# Patient Record
Sex: FEMALE | Race: WHITE | NOT HISPANIC OR LATINO | Employment: UNEMPLOYED | URBAN - METROPOLITAN AREA
[De-identification: names, ages, dates, MRNs, and addresses within clinical notes are randomized per-mention and may not be internally consistent; named-entity substitution may affect disease eponyms.]

---

## 2018-09-14 ENCOUNTER — OFFICE VISIT (OUTPATIENT)
Dept: DERMATOLOGY | Facility: CLINIC | Age: 38
End: 2018-09-14
Payer: COMMERCIAL

## 2018-09-14 VITALS — WEIGHT: 169 LBS | BODY MASS INDEX: 26.47 KG/M2

## 2018-09-14 DIAGNOSIS — L81.0 POST-INFLAMMATORY HYPERPIGMENTATION: ICD-10-CM

## 2018-09-14 DIAGNOSIS — L28.0 LICHEN SIMPLEX: Primary | ICD-10-CM

## 2018-09-14 PROCEDURE — 3008F BODY MASS INDEX DOCD: CPT | Mod: CPTII,S$GLB,, | Performed by: DERMATOLOGY

## 2018-09-14 PROCEDURE — 11900 INJECT SKIN LESIONS </W 7: CPT | Mod: S$GLB,,, | Performed by: DERMATOLOGY

## 2018-09-14 PROCEDURE — 99213 OFFICE O/P EST LOW 20 MIN: CPT | Mod: 25,S$GLB,, | Performed by: DERMATOLOGY

## 2018-09-14 PROCEDURE — 99999 PR PBB SHADOW E&M-EST. PATIENT-LVL III: CPT | Mod: PBBFAC,,, | Performed by: DERMATOLOGY

## 2018-09-14 NOTE — PROGRESS NOTES
Subjective:       Patient ID:  Meri Cosme is a 37 y.o. female who presents for   Chief Complaint   Patient presents with    Spot     left hand, feet     History of Present Illness: The patient presents with chief complaint of spots.  Location: legs, arms, neck  Duration: about a year  Signs/Symptoms: none    Prior treatments: rubs them with otc cream          Review of Systems   Constitutional: Negative for fever.   Skin: Positive for rash. Negative for itching.   Hematologic/Lymphatic: Does not bruise/bleed easily.        Objective:    Physical Exam   Skin:   Areas Examined (abnormalities noted in diagram):   Head / Face Inspection Performed  Neck Inspection Performed  RUE Inspected  LUE Inspection Performed  RLE Inspected  LLE Inspection Performed  Nails and Digits Inspection Performed              Diagram Legend     Erythematous scaling macule/papule c/w actinic keratosis       Vascular papule c/w angioma      Pigmented verrucoid papule/plaque c/w seborrheic keratosis      Yellow umbilicated papule c/w sebaceous hyperplasia      Irregularly shaped tan macule c/w lentigo     1-2 mm smooth white papules consistent with Milia      Movable subcutaneous cyst with punctum c/w epidermal inclusion cyst      Subcutaneous movable cyst c/w pilar cyst      Firm pink to brown papule c/w dermatofibroma      Pedunculated fleshy papule(s) c/w skin tag(s)      Evenly pigmented macule c/w junctional nevus     Mildly variegated pigmented, slightly irregular-bordered macule c/w mildly atypical nevus      Flesh colored to evenly pigmented papule c/w intradermal nevus       Pink pearly papule/plaque c/w basal cell carcinoma      Erythematous hyperkeratotic cursted plaque c/w SCC      Surgical scar with no sign of skin cancer recurrence      Open and closed comedones      Inflammatory papules and pustules      Verrucoid papule consistent consistent with wart     Erythematous eczematous patches and plaques     Dystrophic  onycholytic nail with subungual debris c/w onychomycosis     Umbilicated papule    Erythematous-base heme-crusted tan verrucoid plaque consistent with inflamed seborrheic keratosis     Erythematous Silvery Scaling Plaque c/w Psoriasis     See annotation      Assessment / Plan:        Lichen simplex  Intralesional Kenalog 5mg/cc (0.7 cc total) injected into 5 lesions on the legs arm and neck today after obtaining verbal consent including risk of surrounding hypopigmentation. Patient tolerated procedure well.    Units: 1  NDC for Kenalog 10mg/cc:  3129-0991-98    Explained she needs to dc rubbing or will not resolve        Post-inflammatory hyperpigmentation  Interested in treatment if the pigment not resolved, asking about laser  # given to call to inquire price             Follow-up in about 3 months (around 12/14/2018).

## 2018-11-05 RX ORDER — MUPIROCIN CALCIUM 20 MG/G
CREAM TOPICAL 3 TIMES DAILY
Qty: 30 G | Refills: 3 | Status: SHIPPED | OUTPATIENT
Start: 2018-11-05 | End: 2019-05-01

## 2018-11-08 ENCOUNTER — TELEPHONE (OUTPATIENT)
Dept: DERMATOLOGY | Facility: CLINIC | Age: 38
End: 2018-11-08

## 2018-11-08 NOTE — TELEPHONE ENCOUNTER
----- Message from Mariam Granger sent at 11/8/2018 12:29 PM CST -----  Contact: patient   Patient Returning Call from [facility]    Who Left Message for Patient: Nurse    Communication Preference:[phone]

## 2018-11-08 NOTE — TELEPHONE ENCOUNTER
----- Message from Jayashree Spring MD sent at 11/8/2018 11:53 AM CST -----  Contact: [facility] pharmacy   Please have her ask her pharmacist if the ointment is cheaper on her health plan  ----- Message -----  From: Cheryle Last MA  Sent: 11/8/2018  11:47 AM  To: Jayashree Spring MD Dr, Saw Mrs. Cosme stated that the mupirocin calcium 2% (BACTROBAN) 2 % cream is 299.00 dollars is there a cheaper version then that for the ointment.      ----- Message -----  From: Leah Whitlock  Sent: 11/8/2018  11:39 AM  To: Saw VALLADAR[facility] Calling    Reason for call: pts prescription cost $300.00 pharmacy is asking for something else to be called in   Pharmacy Name: [facility]   Prescription Name: can you call in the ointment for mupirocin would be cheaper for the pt   Phone Number: [phone]  Additional Information: None     REYNALDO

## 2018-11-09 ENCOUNTER — TELEPHONE (OUTPATIENT)
Dept: DERMATOLOGY | Facility: CLINIC | Age: 38
End: 2018-11-09

## 2018-11-09 NOTE — TELEPHONE ENCOUNTER
----- Message from Dawna Fan LPN sent at 11/9/2018 11:36 AM CST -----  Contact: pt at [phone]      ----- Message -----  From: Emily Miller  Sent: 11/9/2018  11:32 AM  To: Saw VALLADARES Staff    [facility] pt-Pt has been waiting 2 days for medication to be called in.  Pt uses [facility] in [place] at [phone].  Pt wants today please.  Call with update.  Needs more affordable medication.

## 2019-01-09 ENCOUNTER — TELEPHONE (OUTPATIENT)
Dept: DERMATOLOGY | Facility: CLINIC | Age: 39
End: 2019-01-09

## 2019-01-09 NOTE — TELEPHONE ENCOUNTER
----- Message from Mariam Granger sent at 1/9/2019 10:32 AM CST -----  Contact: Patient   Needs Advice    Reason for call: Patient states that she was told to contact provider if prescribed medicine was not working for her. Patient states that medicine is not working and would like to discuss a different prescription.      Communication Preference: [phone]

## 2019-05-01 ENCOUNTER — OFFICE VISIT (OUTPATIENT)
Dept: OBSTETRICS AND GYNECOLOGY | Facility: CLINIC | Age: 39
End: 2019-05-01
Payer: COMMERCIAL

## 2019-05-01 VITALS
WEIGHT: 172.81 LBS | DIASTOLIC BLOOD PRESSURE: 74 MMHG | BODY MASS INDEX: 27.12 KG/M2 | SYSTOLIC BLOOD PRESSURE: 116 MMHG | HEIGHT: 67 IN

## 2019-05-01 DIAGNOSIS — O03.4 INCOMPLETE ABORTION: ICD-10-CM

## 2019-05-01 DIAGNOSIS — Z00.00 LABORATORY EXAMINATION ORDERED AS PART OF A ROUTINE GENERAL MEDICAL EXAMINATION: ICD-10-CM

## 2019-05-01 DIAGNOSIS — Z11.3 SCREEN FOR STD (SEXUALLY TRANSMITTED DISEASE): ICD-10-CM

## 2019-05-01 DIAGNOSIS — Z01.419 WELL WOMAN EXAM WITH ROUTINE GYNECOLOGICAL EXAM: Primary | ICD-10-CM

## 2019-05-01 DIAGNOSIS — Z12.4 ROUTINE PAPANICOLAOU SMEAR: ICD-10-CM

## 2019-05-01 PROCEDURE — 87480 CANDIDA DNA DIR PROBE: CPT

## 2019-05-01 PROCEDURE — 99999 PR PBB SHADOW E&M-EST. PATIENT-LVL III: CPT | Mod: PBBFAC,,, | Performed by: OBSTETRICS & GYNECOLOGY

## 2019-05-01 PROCEDURE — 99385 PR PREVENTIVE VISIT,NEW,18-39: ICD-10-PCS | Mod: S$GLB,,, | Performed by: OBSTETRICS & GYNECOLOGY

## 2019-05-01 PROCEDURE — 87510 GARDNER VAG DNA DIR PROBE: CPT

## 2019-05-01 PROCEDURE — 99385 PREV VISIT NEW AGE 18-39: CPT | Mod: S$GLB,,, | Performed by: OBSTETRICS & GYNECOLOGY

## 2019-05-01 PROCEDURE — 87491 CHLMYD TRACH DNA AMP PROBE: CPT

## 2019-05-01 PROCEDURE — 88175 CYTOPATH C/V AUTO FLUID REDO: CPT

## 2019-05-01 PROCEDURE — 99999 PR PBB SHADOW E&M-EST. PATIENT-LVL III: ICD-10-PCS | Mod: PBBFAC,,, | Performed by: OBSTETRICS & GYNECOLOGY

## 2019-05-01 NOTE — PROGRESS NOTES
OBSTETRIC HISTORY:   OB History        3    Para        Term                AB   1    Living   2       SAB   0    TAB   1    Ectopic        Multiple        Live Births                    COMPREHENSIVE GYN HISTORY:  PAP History: Denies abnormal Paps.  Infection History: Denies STDs. Denies PID.  Benign History: Denies uterine fibroids. Denies ovarian cysts. Denies endometriosis.   Cancer History: Denies cervical cancer. Denies uterine cancer or hyperplasia. Denies ovarian cancer. Denies vulvar cancer or pre-cancer. Denies vaginal cancer or pre-cancer. Denies breast cancer. Denies colon cancer.  Sexual Activity History:   reports that she currently engages in sexual activity and has had male partners. She reports using the following method of birth control/protection: None.   Menstrual History: Every 21 days, flows for 5-7 days. Normal flow.  Dysmenorrhea History: Reports occ. dysmenorrhea.  Contraception: None         HPI:   38 y.o.  Patient's last menstrual period was 2018 (exact date).   Patient is  here for her annual gynecologic exam.  Patient is very tearful as she had a termination of a pregnancy in February and she now regrets her decision. We discussed birth control and she will consider the Paragard copper IUD but for now condoms. She states that since the termination she feels like maybe they did not get everything out and she feels itchy and some discomfort in lower abdomen. She also had some dizziness. She denies bladder, breast and bowel complaints.    ROS:  GENERAL: Denies weight gain or weight loss. Feeling well overall.   SKIN: Denies rash or lesions.   HEAD: Denies headache.   NODES: Denies enlarged lymph nodes.   CHEST: Denies shortness of breath.   ABDOMEN: No constipation, diarrhea, nausea, vomiting or rectal bleeding.   URINARY: No frequency, dysuria, hematuria, or burning on urination.  REPRODUCTIVE: See HPI.   BREASTS: The patient denies pain, lumps, or nipple discharge.  "  HEMATOLOGIC: No easy bruisability.   MUSCULOSKELETAL: Denies joint pain or back pain.   NEUROLOGIC: Denies weakness.   PSYCHIATRIC: Denies depression, anxiety or mood swings.    PE:   /74   Ht 5' 7" (1.702 m)   Wt 78.4 kg (172 lb 13.5 oz)   LMP 04/14/2018 (Exact Date)   BMI 27.07 kg/m²   APPEARANCE: Well nourished, well developed, in no acute distress.  NECK: Neck symmetric without  thyromegaly.  NODES: No inguinal, cervical lymph node enlargement.  CHEST: Lungs clear to auscultation.  HEART: Regular rate and rhythm, no murmurs, rubs or gallops.  ABDOMEN: Soft. No tenderness or masses. No hernias.  BREASTS: Symmetrical, no skin changes or visible lesions. No palpable masses, nipple discharge or adenopathy bilaterally.  PELVIC:   VULVA: No lesions. Normal female genitalia.  URETHRAL MEATUS: Normal size and location, no lesions, no prolapse.  URETHRA: No masses, tenderness, prolapse or scarring.  VAGINA: Moist and well rugated, some discharge, no significant cystocele or rectocele.  CERVIX: No lesions and discharge.  UTERUS: Normal size, regular shape, mobile, non-tender, bladder base nontender.  ADNEXA: No masses or tenderness.    PROCEDURES:  Pap smear  Affirm  GC/CT    Assessment:  Normal Gynecologic Exam  Dizziness related to termination-- CBC  Recent termination-- BHCG to rule out retained products  Vaginal discharge-- cultures    Plan:  Mammogram and Colonoscopy if indicated by current recommendations.  Return to clinic in one year or for any problems or complaints.    Counseling:  Patient was counseled today on A.C.S. Pap guidelines and recommendations for yearly pelvic exams and monthly self breast exams; to see her PCP for other health maintenance. Regular exercise and healthy diet.          "

## 2019-05-02 ENCOUNTER — TELEPHONE (OUTPATIENT)
Dept: OBSTETRICS AND GYNECOLOGY | Facility: CLINIC | Age: 39
End: 2019-05-02

## 2019-05-02 ENCOUNTER — LAB VISIT (OUTPATIENT)
Dept: LAB | Facility: HOSPITAL | Age: 39
End: 2019-05-02
Attending: OBSTETRICS & GYNECOLOGY
Payer: COMMERCIAL

## 2019-05-02 DIAGNOSIS — Z00.00 LABORATORY EXAMINATION ORDERED AS PART OF A ROUTINE GENERAL MEDICAL EXAMINATION: ICD-10-CM

## 2019-05-02 DIAGNOSIS — O03.4 INCOMPLETE ABORTION: ICD-10-CM

## 2019-05-02 DIAGNOSIS — Z01.419 WELL WOMAN EXAM WITH ROUTINE GYNECOLOGICAL EXAM: ICD-10-CM

## 2019-05-02 LAB
ANISOCYTOSIS BLD QL SMEAR: SLIGHT
BASOPHILS # BLD AUTO: 0.01 K/UL (ref 0–0.2)
BASOPHILS NFR BLD: 0.3 % (ref 0–1.9)
DIFFERENTIAL METHOD: ABNORMAL
EOSINOPHIL # BLD AUTO: 0.1 K/UL (ref 0–0.5)
EOSINOPHIL NFR BLD: 4.3 % (ref 0–8)
ERYTHROCYTE [DISTWIDTH] IN BLOOD BY AUTOMATED COUNT: 16.9 % (ref 11.5–14.5)
HCG INTACT+B SERPL-ACNC: <1.2 MIU/ML
HCT VFR BLD AUTO: 23.8 % (ref 37–48.5)
HGB BLD-MCNC: 6.4 G/DL (ref 12–16)
HYPOCHROMIA BLD QL SMEAR: ABNORMAL
LYMPHOCYTES # BLD AUTO: 0.9 K/UL (ref 1–4.8)
LYMPHOCYTES NFR BLD: 31.1 % (ref 18–48)
MCH RBC QN AUTO: 19.5 PG (ref 27–31)
MCHC RBC AUTO-ENTMCNC: 26.9 G/DL (ref 32–36)
MCV RBC AUTO: 72 FL (ref 82–98)
MONOCYTES # BLD AUTO: 0.3 K/UL (ref 0.3–1)
MONOCYTES NFR BLD: 9.3 % (ref 4–15)
NEUTROPHILS # BLD AUTO: 1.7 K/UL (ref 1.8–7.7)
NEUTROPHILS NFR BLD: 55 % (ref 38–73)
PLATELET # BLD AUTO: 260 K/UL (ref 150–350)
PLATELET BLD QL SMEAR: ABNORMAL
PMV BLD AUTO: 10 FL (ref 9.2–12.9)
POIKILOCYTOSIS BLD QL SMEAR: SLIGHT
POLYCHROMASIA BLD QL SMEAR: ABNORMAL
RBC # BLD AUTO: 3.29 M/UL (ref 4–5.4)
WBC # BLD AUTO: 3.02 K/UL (ref 3.9–12.7)

## 2019-05-02 PROCEDURE — 84702 CHORIONIC GONADOTROPIN TEST: CPT

## 2019-05-02 PROCEDURE — 85025 COMPLETE CBC W/AUTO DIFF WBC: CPT

## 2019-05-02 PROCEDURE — 36415 COLL VENOUS BLD VENIPUNCTURE: CPT

## 2019-05-02 NOTE — TELEPHONE ENCOUNTER
"Patient notified of results and her options.  Patient states she is "not having symptoms any more".  Patient notified if she starts having symptoms then she should seriously consider going to the ED for a blood transfusion.  Patient verbalized understanding.  "

## 2019-05-02 NOTE — TELEPHONE ENCOUNTER
Notify not pregnant but has severe anemia. If having symptoms of anemia like dizziness and she would be willing to get a blood transfusion she needs to go to ED. If she is not willing to get a blood transfusion she needs to start on OTC iron immediately.

## 2019-05-03 ENCOUNTER — HOSPITAL ENCOUNTER (OUTPATIENT)
Facility: HOSPITAL | Age: 39
Discharge: HOME | End: 2019-05-04
Attending: EMERGENCY MEDICINE | Admitting: HOSPITALIST
Payer: COMMERCIAL

## 2019-05-03 DIAGNOSIS — D64.9 SYMPTOMATIC ANEMIA: Primary | ICD-10-CM

## 2019-05-03 LAB
ABO + RH BLD: NORMAL
ANION GAP SERPL CALC-SCNC: 10 MMOL/L (ref 8–16)
BACTERIAL VAGINOSIS DNA: NEGATIVE
BASOPHILS # BLD AUTO: 0.01 K/UL (ref 0–0.2)
BASOPHILS NFR BLD: 0.3 % (ref 0–1.9)
BILIRUB SERPL-MCNC: 0.5 MG/DL (ref 0.1–1)
BLD GP AB SCN CELLS X3 SERPL QL: NORMAL
BLD PROD TYP BPU: NORMAL
BLD PROD TYP BPU: NORMAL
BLOOD UNIT EXPIRATION DATE: NORMAL
BLOOD UNIT EXPIRATION DATE: NORMAL
BLOOD UNIT TYPE CODE: 9500
BLOOD UNIT TYPE CODE: 9500
BLOOD UNIT TYPE: NORMAL
BLOOD UNIT TYPE: NORMAL
BUN SERPL-MCNC: 15 MG/DL (ref 6–20)
C TRACH DNA SPEC QL NAA+PROBE: NOT DETECTED
CALCIUM SERPL-MCNC: 9.5 MG/DL (ref 8.7–10.5)
CANDIDA GLABRATA DNA: NORMAL
CANDIDA KRUSEI DNA: NORMAL
CANDIDA RRNA VAG QL PROBE: NORMAL
CHLORIDE SERPL-SCNC: 109 MMOL/L (ref 95–110)
CO2 SERPL-SCNC: 20 MMOL/L (ref 23–29)
CODING SYSTEM: NORMAL
CODING SYSTEM: NORMAL
CREAT SERPL-MCNC: 0.7 MG/DL (ref 0.5–1.4)
DIFFERENTIAL METHOD: ABNORMAL
DISPENSE STATUS: NORMAL
DISPENSE STATUS: NORMAL
EOSINOPHIL # BLD AUTO: 0.1 K/UL (ref 0–0.5)
EOSINOPHIL NFR BLD: 2.3 % (ref 0–8)
ERYTHROCYTE [DISTWIDTH] IN BLOOD BY AUTOMATED COUNT: 16.9 % (ref 11.5–14.5)
EST. GFR  (AFRICAN AMERICAN): >60 ML/MIN/1.73 M^2
EST. GFR  (NON AFRICAN AMERICAN): >60 ML/MIN/1.73 M^2
FERRITIN SERPL-MCNC: 2 NG/ML (ref 20–300)
GLUCOSE SERPL-MCNC: 75 MG/DL (ref 70–110)
HCT VFR BLD AUTO: 25.5 % (ref 37–48.5)
HGB BLD-MCNC: 6.9 G/DL (ref 12–16)
HYPOCHROMIA BLD QL SMEAR: ABNORMAL
IRON SERPL-MCNC: 17 UG/DL (ref 30–160)
LYMPHOCYTES # BLD AUTO: 1.2 K/UL (ref 1–4.8)
LYMPHOCYTES NFR BLD: 37.7 % (ref 18–48)
MCH RBC QN AUTO: 19.3 PG (ref 27–31)
MCHC RBC AUTO-ENTMCNC: 27.1 G/DL (ref 32–36)
MCV RBC AUTO: 71 FL (ref 82–98)
MONOCYTES # BLD AUTO: 0.3 K/UL (ref 0.3–1)
MONOCYTES NFR BLD: 9.4 % (ref 4–15)
N GONORRHOEA DNA SPEC QL NAA+PROBE: NOT DETECTED
NEUTROPHILS # BLD AUTO: 1.6 K/UL (ref 1.8–7.7)
NEUTROPHILS NFR BLD: 50.3 % (ref 38–73)
PLATELET # BLD AUTO: 270 K/UL (ref 150–350)
PLATELET BLD QL SMEAR: ABNORMAL
PMV BLD AUTO: 10 FL (ref 9.2–12.9)
POLYCHROMASIA BLD QL SMEAR: ABNORMAL
POTASSIUM SERPL-SCNC: 4.2 MMOL/L (ref 3.5–5.1)
RBC # BLD AUTO: 3.58 M/UL (ref 4–5.4)
RH BLD: NORMAL
SATURATED IRON: 3 % (ref 20–50)
SODIUM SERPL-SCNC: 139 MMOL/L (ref 136–145)
T VAGINALIS RRNA GENITAL QL PROBE: NORMAL
TOTAL IRON BINDING CAPACITY: 608 UG/DL (ref 250–450)
TRANS ERYTHROCYTES VOL PATIENT: NORMAL ML
TRANS ERYTHROCYTES VOL PATIENT: NORMAL ML
TRANSFERRIN SERPL-MCNC: 411 MG/DL (ref 200–375)
WBC # BLD AUTO: 3.08 K/UL (ref 3.9–12.7)

## 2019-05-03 PROCEDURE — 83540 ASSAY OF IRON: CPT

## 2019-05-03 PROCEDURE — 86901 BLOOD TYPING SEROLOGIC RH(D): CPT

## 2019-05-03 PROCEDURE — 86850 RBC ANTIBODY SCREEN: CPT

## 2019-05-03 PROCEDURE — 99285 EMERGENCY DEPT VISIT HI MDM: CPT | Mod: 25

## 2019-05-03 PROCEDURE — P9021 RED BLOOD CELLS UNIT: HCPCS

## 2019-05-03 PROCEDURE — G0378 HOSPITAL OBSERVATION PER HR: HCPCS

## 2019-05-03 PROCEDURE — 36430 TRANSFUSION BLD/BLD COMPNT: CPT

## 2019-05-03 PROCEDURE — 82247 BILIRUBIN TOTAL: CPT

## 2019-05-03 PROCEDURE — 80048 BASIC METABOLIC PNL TOTAL CA: CPT

## 2019-05-03 PROCEDURE — 85025 COMPLETE CBC W/AUTO DIFF WBC: CPT

## 2019-05-03 PROCEDURE — 86920 COMPATIBILITY TEST SPIN: CPT

## 2019-05-03 PROCEDURE — 82728 ASSAY OF FERRITIN: CPT

## 2019-05-03 RX ORDER — ONDANSETRON 4 MG/1
4 TABLET, ORALLY DISINTEGRATING ORAL EVERY 6 HOURS PRN
Status: DISCONTINUED | OUTPATIENT
Start: 2019-05-03 | End: 2019-05-04 | Stop reason: HOSPADM

## 2019-05-03 RX ORDER — ONDANSETRON 2 MG/ML
4 INJECTION INTRAMUSCULAR; INTRAVENOUS EVERY 6 HOURS PRN
Status: DISCONTINUED | OUTPATIENT
Start: 2019-05-03 | End: 2019-05-04 | Stop reason: HOSPADM

## 2019-05-03 RX ORDER — SODIUM CHLORIDE 0.9 % (FLUSH) 0.9 %
10 SYRINGE (ML) INJECTION
Status: DISCONTINUED | OUTPATIENT
Start: 2019-05-03 | End: 2019-05-04 | Stop reason: HOSPADM

## 2019-05-03 RX ORDER — HYDROCODONE BITARTRATE AND ACETAMINOPHEN 500; 5 MG/1; MG/1
TABLET ORAL
Status: DISCONTINUED | OUTPATIENT
Start: 2019-05-03 | End: 2019-05-04 | Stop reason: HOSPADM

## 2019-05-03 RX ORDER — ACETAMINOPHEN 325 MG/1
650 TABLET ORAL EVERY 4 HOURS PRN
Status: DISCONTINUED | OUTPATIENT
Start: 2019-05-03 | End: 2019-05-04 | Stop reason: HOSPADM

## 2019-05-03 NOTE — ED NOTES
Spoke with MARCOS Torres from Dr. Wynn's office she reports she spoke with pt yesterday pt c/o dizziness and not feeling well after elective  in February. Pt was sent for labs  and informed pt of her hemoglobin was 6.4 and she was advised to report to ED for further evaluation and blood transfusion. Pt could not come in until today.

## 2019-05-03 NOTE — H&P
[facility] Medicine  History & Physical    Patient Name: Meri Cosme  MRN: [id number]  Admission Date: 5/3/2019  Attending Physician: Mandeep Delvalle DO   Primary Care Provider: Primary Doctor No         Patient information was obtained from patient, past medical records and ER records.     Subjective:     Principal Problem:Symptomatic anemia    Chief Complaint:   Chief Complaint   Patient presents with    Abnormal Lab     Pt presents to ED today per Dr Wynn for blood transfusion. Upon arriving pt requested RN to call Dr. Wynn's office and confirm that she was not a direct admit to the hospital. RN spoke with MARCOS Torres at Dr. Wynn's office who confirmed pt was instruced yesterday to report to ED for hemoglobin level of 6.4. pt informed of information and she requested to call billing office to confirm price of ED visit prior to being triage. after speaking to billing office pt  states she was ready to be seen         HPI: Ms. Meri Cosme is a 37 y/o female who lives in [place], [place] at her residence. The patient has no PCP at this time. The patient has a past medical history of heart disease. The patient has a past surgical history of a breast reduction (2014), , and  (). The patient smokes cigarettes, drinks occasional alcohol, and denies illicit drug use. The patient presents to the ED here at [facility] with complaints of fatigue. The patient saw her OBGYN on yesterday for her annual exam, hemoglobin was noted to be low. Patient was advised by her OBGYN to come into the hospital for blood transfusion. The patient reports feeling fatigued and weak over 2 months since she terminated her pregnancy. Annual exam on yesterday did not show any abnormalities.  The patient denies fever, chills, SOB, CP, palpitations, abd pain, nausea, vomiting, diarrhea, feeling lightheaded, and she denies syncope at home. The patient also reports heavy cycles on  a normal basis before the pregnancy was terminated. Cycles lasts for 7 days, very heavy for the first 5 days. The patients labs show hemoglobin---6.9, hematocrit---25.5. The patient was admitted to the CDU for observation.     Past Medical History:   Diagnosis Date    Heart disease        Past Surgical History:   Procedure Laterality Date    BREAST RECONSTRUCTION  2014    lift     SECTION         Review of patient's allergies indicates:  No Known Allergies    No current facility-administered medications on file prior to encounter.      Current Outpatient Medications on File Prior to Encounter   Medication Sig    clindamycin (CLEOCIN T) 1 % lotion Apply topically 2 (two) times daily.     Family History     Problem Relation (Age of Onset)    Heart disease Mother        Tobacco Use    Smoking status: Current Some Day Smoker    Tobacco comment: light smoker   Substance and Sexual Activity    Alcohol use: Yes     Comment: ocacsional    Drug use: No    Sexual activity: Yes     Partners: Male     Birth control/protection: None     Review of Systems   Constitutional: Positive for activity change, appetite change and fatigue. Negative for chills, diaphoresis, fever and unexpected weight change.   HENT: Negative for trouble swallowing.    Eyes: Negative for visual disturbance.   Respiratory: Negative for cough, shortness of breath and wheezing.    Cardiovascular: Negative for chest pain, palpitations and leg swelling.   Gastrointestinal: Negative for abdominal distention, abdominal pain, blood in stool, constipation, diarrhea, nausea and vomiting.   Genitourinary: Negative for difficulty urinating and hematuria.   Musculoskeletal: Negative for arthralgias, back pain, gait problem, joint swelling and myalgias.   Skin: Negative for color change, pallor, rash and wound.   Neurological: Positive for weakness. Negative for dizziness, tremors, seizures, syncope, facial asymmetry, speech difficulty,  light-headedness, numbness and headaches.   Hematological: Does not bruise/bleed easily.   Psychiatric/Behavioral: Negative for agitation, confusion and hallucinations. The patient is not nervous/anxious and is not hyperactive.      Objective:     Vital Signs (Most Recent):  Temp: 99.4 °F (37.4 °C) (05/03/19 1418)  Pulse: 67 (05/03/19 1418)  Resp: 16 (05/03/19 1418)  BP: (!) 99/51 (05/03/19 1418)  SpO2: 100 % (05/03/19 1418) Vital Signs (24h Range):  Temp:  [98.3 °F (36.8 °C)-99.4 °F (37.4 °C)] 99.4 °F (37.4 °C)  Pulse:  [64-78] 67  Resp:  [16-19] 16  SpO2:  [99 %-100 %] 100 %  BP: ()/(51-74) 99/51     Weight: 78 kg (172 lb)  Body mass index is 26.94 kg/m².    Physical Exam   Constitutional: She is oriented to person, place, and time. She appears well-developed and well-nourished. No distress.   HENT:   Head: Normocephalic and atraumatic.   Eyes: Pupils are equal, round, and reactive to light. Conjunctivae and EOM are normal.   Neck: Normal range of motion. Neck supple. No JVD present.   Cardiovascular: Normal rate, regular rhythm, normal heart sounds and intact distal pulses.   No murmur heard.  Pulmonary/Chest: Effort normal and breath sounds normal. No respiratory distress. She has no wheezes.   Abdominal: Soft. Bowel sounds are normal. She exhibits no distension. There is no tenderness. There is no guarding.   Musculoskeletal: Normal range of motion.   Neurological: She is alert and oriented to person, place, and time.   Skin: Skin is warm and dry. Capillary refill takes less than 2 seconds. No rash noted. She is not diaphoretic. No erythema.   Psychiatric: She has a normal mood and affect. Her behavior is normal. Judgment and thought content normal.   Nursing note and vitals reviewed.        CRANIAL NERVES     CN III, IV, VI   Pupils are equal, round, and reactive to light.  Extraocular motions are normal.        Significant Labs:   CBC:   Recent Labs   Lab 05/02/19  0810 05/03/19  1124   WBC 3.02* 3.08*    HGB 6.4* 6.9*   HCT 23.8* 25.5*    270       Significant Imaging: I have reviewed all pertinent imaging results/findings within the past 24 hours.    Assessment/Plan:     * Symptomatic anemia  The patient reports an  2 months ago, since then she has been feeling fatigued. The patient also reports heavy menses; menses a total of 7 days, very heavy the first 5 days.    -hemoglobin 6.9  -ordered 2 units of PRBCs  -check Iron studies  -monitor CBC        History of syncope  No problems at this time.  Monitor  Fall precautions         VTE Risk Mitigation (From admission, onward)        Ordered     Place sequential compression device  Until discontinued      19 1308     IP VTE LOW RISK PATIENT  Once      19 1308             Tan Fagan NP  Department of Hospital Medicine   [facility]

## 2019-05-03 NOTE — SUBJECTIVE & OBJECTIVE
Past Medical History:   Diagnosis Date    Heart disease        Past Surgical History:   Procedure Laterality Date    BREAST RECONSTRUCTION  2014    lift     SECTION         Review of patient's allergies indicates:  No Known Allergies    No current facility-administered medications on file prior to encounter.      Current Outpatient Medications on File Prior to Encounter   Medication Sig    clindamycin (CLEOCIN T) 1 % lotion Apply topically 2 (two) times daily.     Family History     Problem Relation (Age of Onset)    Heart disease Mother        Tobacco Use    Smoking status: Current Some Day Smoker    Tobacco comment: light smoker   Substance and Sexual Activity    Alcohol use: Yes     Comment: ocacsional    Drug use: No    Sexual activity: Yes     Partners: Male     Birth control/protection: None     Review of Systems   Constitutional: Positive for activity change, appetite change and fatigue. Negative for chills, diaphoresis, fever and unexpected weight change.   HENT: Negative for trouble swallowing.    Eyes: Negative for visual disturbance.   Respiratory: Negative for cough, shortness of breath and wheezing.    Cardiovascular: Negative for chest pain, palpitations and leg swelling.   Gastrointestinal: Negative for abdominal distention, abdominal pain, blood in stool, constipation, diarrhea, nausea and vomiting.   Genitourinary: Negative for difficulty urinating and hematuria.   Musculoskeletal: Negative for arthralgias, back pain, gait problem, joint swelling and myalgias.   Skin: Negative for color change, pallor, rash and wound.   Neurological: Positive for weakness. Negative for dizziness, tremors, seizures, syncope, facial asymmetry, speech difficulty, light-headedness, numbness and headaches.   Hematological: Does not bruise/bleed easily.   Psychiatric/Behavioral: Negative for agitation, confusion and hallucinations. The patient is not nervous/anxious and is not hyperactive.       Objective:     Vital Signs (Most Recent):  Temp: 99.4 °F (37.4 °C) (05/03/19 1418)  Pulse: 67 (05/03/19 1418)  Resp: 16 (05/03/19 1418)  BP: (!) 99/51 (05/03/19 1418)  SpO2: 100 % (05/03/19 1418) Vital Signs (24h Range):  Temp:  [98.3 °F (36.8 °C)-99.4 °F (37.4 °C)] 99.4 °F (37.4 °C)  Pulse:  [64-78] 67  Resp:  [16-19] 16  SpO2:  [99 %-100 %] 100 %  BP: ()/(51-74) 99/51     Weight: 78 kg (172 lb)  Body mass index is 26.94 kg/m².    Physical Exam   Constitutional: She is oriented to person, place, and time. She appears well-developed and well-nourished. No distress.   HENT:   Head: Normocephalic and atraumatic.   Eyes: Pupils are equal, round, and reactive to light. Conjunctivae and EOM are normal.   Neck: Normal range of motion. Neck supple. No JVD present.   Cardiovascular: Normal rate, regular rhythm, normal heart sounds and intact distal pulses.   No murmur heard.  Pulmonary/Chest: Effort normal and breath sounds normal. No respiratory distress. She has no wheezes.   Abdominal: Soft. Bowel sounds are normal. She exhibits no distension. There is no tenderness. There is no guarding.   Musculoskeletal: Normal range of motion.   Neurological: She is alert and oriented to person, place, and time.   Skin: Skin is warm and dry. Capillary refill takes less than 2 seconds. No rash noted. She is not diaphoretic. No erythema.   Psychiatric: She has a normal mood and affect. Her behavior is normal. Judgment and thought content normal.   Nursing note and vitals reviewed.        CRANIAL NERVES     CN III, IV, VI   Pupils are equal, round, and reactive to light.  Extraocular motions are normal.        Significant Labs:   CBC:   Recent Labs   Lab 05/02/19  0810 05/03/19  1124   WBC 3.02* 3.08*   HGB 6.4* 6.9*   HCT 23.8* 25.5*    270       Significant Imaging: I have reviewed all pertinent imaging results/findings within the past 24 hours.

## 2019-05-03 NOTE — HPI
Ms. Meri Cosme is a 39 y/o female who lives in [place], [place] at her residence. The patient has no PCP at this time. The patient has a past medical history of heart disease. The patient has a past surgical history of a breast reduction (2014), , and  (2019). The patient smokes cigarettes, drinks occasional alcohol, and denies illicit drug use. The patient presents to the ED here at [facility] with complaints of fatigue. The patient saw her OBGYN on yesterday for her annual exam, hemoglobin was noted to be low. Patient was advised by her OBGYN to come into the hospital for blood transfusion. The patient reports feeling fatigued and weak over 2 months since she terminated her pregnancy. Annual exam on yesterday did not show any abnormalities.  The patient denies fever, chills, SOB, CP, palpitations, abd pain, nausea, vomiting, diarrhea, feeling lightheaded, and she denies syncope at home. The patient also reports heavy cycles on a normal basis before the pregnancy was terminated. Cycles lasts for 7 days, very heavy for the first 5 days. The patients labs show hemoglobin---6.9, hematocrit---25.5. The patient was admitted to the CDU for observation.

## 2019-05-03 NOTE — ASSESSMENT & PLAN NOTE
The patient reports an  2 months ago, since then she has been feeling fatigued. The patient also reports heavy menses; menses a total of 7 days, very heavy the first 5 days.    -hemoglobin 6.9  -ordered 2 units of PRBCs  -check Iron studies  -monitor CBC

## 2019-05-03 NOTE — ED TRIAGE NOTES
Pt sent in by doctor for transfusion , pt had the  pill in February and bleed after for 2 months , pt states she bleed heavy with clots using 15 to 20 boxes of pads  for the entire  2 months time  , pt states she began feeling weak , tired , faint when trying to stand  and dizzy during this time  , pt stopped bleeding  during that time she started to feel lower rt side abdominal pain and fatigue , presently still feeling fatigue pain has resolved

## 2019-05-03 NOTE — PLAN OF CARE
Problem: Adult Inpatient Plan of Care  Goal: Plan of Care Review  Outcome: Ongoing (interventions implemented as appropriate)  Patient safety maintained throughout this shift, IV infusing, tele monitor active, pt receiving 2 units PRBCs, the first unit coagulated three times, blood tubing was changed three times until blood stop infusing and first unit was stopped with 55 mls left, 2nd unit blood started on pt and infusing without difficulty, no signs or symptoms of transfusion reaction @ present, labs to be drawn after infusion, pt lying in bed quietly, bed alarm active, pt in stable condition, no signs of pain or discomfort, will continue to monitor

## 2019-05-04 VITALS
SYSTOLIC BLOOD PRESSURE: 112 MMHG | BODY MASS INDEX: 27 KG/M2 | TEMPERATURE: 98 F | OXYGEN SATURATION: 99 % | DIASTOLIC BLOOD PRESSURE: 53 MMHG | HEART RATE: 63 BPM | RESPIRATION RATE: 15 BRPM | WEIGHT: 172 LBS | HEIGHT: 67 IN

## 2019-05-04 PROBLEM — D64.9 SYMPTOMATIC ANEMIA: Status: RESOLVED | Noted: 2019-05-03 | Resolved: 2019-05-04

## 2019-05-04 LAB
BASOPHILS # BLD AUTO: 0.01 K/UL (ref 0–0.2)
BASOPHILS NFR BLD: 0.3 % (ref 0–1.9)
DIFFERENTIAL METHOD: ABNORMAL
EOSINOPHIL # BLD AUTO: 0.1 K/UL (ref 0–0.5)
EOSINOPHIL NFR BLD: 2.7 % (ref 0–8)
ERYTHROCYTE [DISTWIDTH] IN BLOOD BY AUTOMATED COUNT: 18.2 % (ref 11.5–14.5)
HCT VFR BLD AUTO: 28.2 % (ref 37–48.5)
HGB BLD-MCNC: 8.1 G/DL (ref 12–16)
LYMPHOCYTES # BLD AUTO: 1 K/UL (ref 1–4.8)
LYMPHOCYTES NFR BLD: 26.2 % (ref 18–48)
MCH RBC QN AUTO: 21.3 PG (ref 27–31)
MCHC RBC AUTO-ENTMCNC: 28.7 G/DL (ref 32–36)
MCV RBC AUTO: 74 FL (ref 82–98)
MONOCYTES # BLD AUTO: 0.4 K/UL (ref 0.3–1)
MONOCYTES NFR BLD: 10.1 % (ref 4–15)
NEUTROPHILS # BLD AUTO: 2.2 K/UL (ref 1.8–7.7)
NEUTROPHILS NFR BLD: 60.7 % (ref 38–73)
PLATELET # BLD AUTO: 229 K/UL (ref 150–350)
PMV BLD AUTO: 10.7 FL (ref 9.2–12.9)
RBC # BLD AUTO: 3.81 M/UL (ref 4–5.4)
WBC # BLD AUTO: 3.67 K/UL (ref 3.9–12.7)

## 2019-05-04 PROCEDURE — 36415 COLL VENOUS BLD VENIPUNCTURE: CPT

## 2019-05-04 PROCEDURE — 85025 COMPLETE CBC W/AUTO DIFF WBC: CPT

## 2019-05-04 PROCEDURE — G0378 HOSPITAL OBSERVATION PER HR: HCPCS

## 2019-05-04 RX ORDER — FERROUS SULFATE 325(65) MG
325 TABLET ORAL
Qty: 30 TABLET | Refills: 11 | Status: SHIPPED | OUTPATIENT
Start: 2019-05-04 | End: 2020-06-15

## 2019-05-04 RX ORDER — SENNOSIDES 8.6 MG/1
1 TABLET ORAL DAILY
Qty: 30 TABLET | Refills: 11 | Status: SHIPPED | OUTPATIENT
Start: 2019-05-04 | End: 2019-05-22

## 2019-05-04 NOTE — DISCHARGE SUMMARY
[facility] Medicine  Discharge Summary      Patient Name: Meri Cosme  MRN: [id number]  Admission Date: 5/3/2019  Hospital Length of Stay: 0 days  Discharge Date and Time:  2019 8:22 AM  Attending Physician: Mandeep Delvalle DO   Discharging Provider: Tan Fagan NP  Primary Care Provider: Primary Doctor No      HPI:   Ms. Meri Cosme is a 37 y/o female who lives in [place], [place] at her residence. The patient has no PCP at this time. The patient has a past medical history of heart disease. The patient has a past surgical history of a breast reduction (2014), , and  (). The patient smokes cigarettes, drinks occasional alcohol, and denies illicit drug use. The patient presents to the ED here at [facility] with complaints of fatigue. The patient saw her OBGYN on yesterday for her annual exam, hemoglobin was noted to be low. Patient was advised by her OBGYN to come into the hospital for blood transfusion. The patient reports feeling fatigued and weak over 2 months since she terminated her pregnancy. Annual exam on yesterday did not show any abnormalities.  The patient denies fever, chills, SOB, CP, palpitations, abd pain, nausea, vomiting, diarrhea, feeling lightheaded, and she denies syncope at home. The patient also reports heavy cycles on a normal basis before the pregnancy was terminated. Cycles lasts for 7 days, very heavy for the first 5 days. The patients labs show hemoglobin---6.9, hematocrit---25.5. The patient was admitted to the CDU for observation.     * No surgery found *      Hospital Course:   The patient was admitted to the CDU for observation, no distress noted. S/p 2 units PRBCs---hemoglobin 6.9>8.1. Ok for d/c home. Will d/c home with Iron 325 mg PO daily and senna stool softeners to help with potential constipation. Patient has no PCP, ambulatory referral to Dr. Alston's office to establish care.      Consults:      * Symptomatic anemia-resolved as of 2019  The patient reports an  2 months ago, since then she has been feeling fatigued. The patient also reports heavy menses; menses a total of 7 days, very heavy the first 5 days.    -hemoglobin 6.9>8.1  -s/p 2 units of PRBCs  -checked Iron studies  -f/u with OBGYN in 2 weeks, ambulatory referral to Dr. Bro's office. Instructed the patient to call on Monday to establish care.  -sent prescription for Ferrous sulfate 325 mg PO daily to the patients pharmacy.           Final Active Diagnoses:    Diagnosis Date Noted POA    History of syncope [Z87.898] 2015 Not Applicable      Problems Resolved During this Admission:    Diagnosis Date Noted Date Resolved POA    PRINCIPAL PROBLEM:  Symptomatic anemia [D64.9] 2019 Yes       Discharged Condition: good    Disposition: Home or Self Care    Follow Up:  Follow-up Information     Kathryn Wynn MD In 2 weeks.    Specialties:  Obstetrics, Obstetrics and Gynecology  Why:  hospital follow up  Contact information:  [address] [place], [place] [address] [phone]                 Patient Instructions:      Ambulatory Referral to Internal Medicine   Referral Priority: Routine Referral Type: Consultation   Referral Reason: Specialty Services Required   Referred to Provider: SOHA BRO Requested Specialty: Internal Medicine   Number of Visits Requested: 1     Diet Adult Regular     Notify your health care provider if you experience any of the following:  temperature >100.4     Notify your health care provider if you experience any of the following:  persistent nausea and vomiting or diarrhea     Notify your health care provider if you experience any of the following:  severe uncontrolled pain     Notify your health care provider if you experience any of the following:  increased confusion or weakness     Notify your health care provider if you experience any of the following:  persistent dizziness,  light-headedness, or visual disturbances     Notify your health care provider if you experience any of the following:  worsening rash     Notify your health care provider if you experience any of the following:  severe persistent headache     Notify your health care provider if you experience any of the following:  difficulty breathing or increased cough     Activity as tolerated       Significant Diagnostic Studies: Labs:   CBC   Recent Labs   Lab 05/03/19  1124 05/04/19  0449   WBC 3.08* 3.67*   HGB 6.9* 8.1*   HCT 25.5* 28.2*    229       Pending Diagnostic Studies:     None         Medications:  Reconciled Home Medications:      Medication List      START taking these medications    ferrous sulfate 325 mg (65 mg iron) Tab tablet  Commonly known as:  FEOSOL  Take 1 tablet (325 mg total) by mouth daily with breakfast.     senna 8.6 mg tablet  Commonly known as:  SENNA  Take 1 tablet by mouth once daily.        STOP taking these medications    clindamycin 1 % lotion  Commonly known as:  CLEOCIN T            Indwelling Lines/Drains at time of discharge:   Lines/Drains/Airways          None          Time spent on the discharge of patient: 35 minutes  Patient was seen and examined on the date of discharge and determined to be suitable for discharge.         Tan Fagan NP  Department of Hospital Medicine  [facility]

## 2019-05-04 NOTE — ASSESSMENT & PLAN NOTE
The patient reports an  2 months ago, since then she has been feeling fatigued. The patient also reports heavy menses; menses a total of 7 days, very heavy the first 5 days.    -hemoglobin 6.9>8.1  -s/p 2 units of PRBCs  -checked Iron studies  -f/u with OBGYN in 2 weeks, ambulatory referral to Dr. Alston's office. Instructed the patient to call on Monday to establish care.  -sent prescription for Ferrous sulfate 325 mg PO daily to the patients pharmacy.

## 2019-05-04 NOTE — PLAN OF CARE
Discharge orders noted, no HH or HME ordered.    Pt's nurse will go over medications/signs and symptoms prior to discharge       05/04/19 0836   Final Note   Assessment Type Final Discharge Note   Anticipated Discharge Disposition Home   What phone number can be called within the next 1-3 days to see how you are doing after discharge? [phone]   Hospital Follow Up  Appt(s) scheduled? No  (Offices closed for Weekend.  Patient to schedule own follow up appointment.)   Right Care Referral Info   Post Acute Recommendation No Care     Yesica Hall RN Transitional Navigator  [phone]

## 2019-05-04 NOTE — HOSPITAL COURSE
The patient was admitted to the CDU for observation, no distress noted. S/p 2 units PRBCs---hemoglobin 6.9>8.1. Ok for d/c home. Will d/c home with Iron 325 mg PO daily and senna stool softeners to help with potential constipation. Patient has no PCP, ambulatory referral to Dr. Alston's office to establish care.

## 2019-05-04 NOTE — ED PROVIDER NOTES
Encounter Date: 5/3/2019       History     Chief Complaint   Patient presents with    Abnormal Lab     Pt presents to ED today per Dr Wynn for blood transfusion. Upon arriving pt requested RN to call Dr. Wynn's office and confirm that she was not a direct admit to the hospital. RN spoke with MARCOS Torres at Dr. Wynn's office who confirmed pt was instruced yesterday to report to ED for hemoglobin level of 6.4. pt informed of information and she requested to call billing office to confirm price of ED visit prior to being triage. after speaking to billing office pt  states she was ready to be seen      Patient is a 30-year-old female presents ED with complaint of abnormal labs.  Patient recently told by OB/GYN  that hemoglobin was low to the point of needing transfusion. Pt underwent elective  but denies any current bleeding (vaginal, rectal or elsewhere). Pt does endorse fatigue when asked. She denies any CP/SOB/N/V/fever/chills. Pt denies hx of anemia or need for blood transfusion.         Review of patient's allergies indicates:  No Known Allergies  Past Medical History:   Diagnosis Date    Heart disease      Past Surgical History:   Procedure Laterality Date    BREAST RECONSTRUCTION  2014    lift     SECTION       Family History   Problem Relation Age of Onset    Heart disease Mother     Melanoma Neg Hx     Breast cancer Neg Hx     Colon cancer Neg Hx     Ovarian cancer Neg Hx      Social History     Tobacco Use    Smoking status: Current Some Day Smoker    Tobacco comment: light smoker   Substance Use Topics    Alcohol use: Yes     Comment: ocacsional    Drug use: No     Review of Systems   Constitutional: Positive for fatigue. Negative for activity change and chills.   HENT: Negative for congestion and ear discharge.    Respiratory: Negative for chest tightness and shortness of breath.    Cardiovascular: Negative for chest pain, palpitations and leg swelling.    Gastrointestinal: Negative for abdominal pain, anal bleeding, blood in stool, nausea and vomiting.   Genitourinary: Negative for dysuria, frequency and urgency.   Musculoskeletal: Negative for arthralgias and back pain.   Skin: Negative for color change.   Neurological: Positive for light-headedness. Negative for dizziness and headaches.   Psychiatric/Behavioral: Negative for agitation and confusion.       Physical Exam     Initial Vitals [05/03/19 1056]   BP Pulse Resp Temp SpO2   126/74 75 19 98.3 °F (36.8 °C) 99 %      MAP       --         Physical Exam    Constitutional: She appears well-developed and well-nourished.   HENT:   Head: Normocephalic and atraumatic.   Right Ear: External ear normal.   Left Ear: External ear normal.   Eyes: EOM are normal. Pupils are equal, round, and reactive to light.   Mild conjunctival pallor bilaterally    Neck: Normal range of motion. Neck supple.   Cardiovascular: Normal rate, regular rhythm, normal heart sounds and intact distal pulses.   Pulmonary/Chest: Breath sounds normal.   Musculoskeletal: Normal range of motion.   Neurological: She is alert and oriented to person, place, and time.   Skin: Skin is warm and dry.   Psychiatric: She has a normal mood and affect.         ED Course   Procedures  Labs Reviewed   CBC W/ AUTO DIFFERENTIAL - Abnormal; Notable for the following components:       Result Value    WBC 3.08 (*)     RBC 3.58 (*)     Hemoglobin 6.9 (*)     Hematocrit 25.5 (*)     Mean Corpuscular Volume 71 (*)     Mean Corpuscular Hemoglobin 19.3 (*)     Mean Corpuscular Hemoglobin Conc 27.1 (*)     RDW 16.9 (*)     Gran # (ANC) 1.6 (*)     All other components within normal limits   FERRITIN   IRON AND TIBC   TYPE & SCREEN          Imaging Results    None          Medical Decision Making:   Clinical Tests:   Lab Tests: Ordered and Reviewed  ED Management:  - Pt's Hb 6.9  - Pt HDS  - VSS, NAD  - Pt endorsing symptoms of fatigue  - Pt consents to transfusion; will  transfuse and admit to [facility] medicine for symptomatic anemia                        Clinical Impression:       ICD-10-CM ICD-9-CM   1. Symptomatic anemia D64.9 285.9                                Blaze Kenney MD  05/03/19 2058

## 2019-05-06 ENCOUNTER — TELEPHONE (OUTPATIENT)
Dept: FAMILY MEDICINE | Facility: CLINIC | Age: 39
End: 2019-05-06

## 2019-05-06 NOTE — TELEPHONE ENCOUNTER
----- Message from Agueda Ramos sent at 5/6/2019 11:00 AM CDT -----  Contact: self / [phone]  Patient is requesting a call back regarding she was a past patient and needs a hospital follow up for low blood level . Please advise

## 2019-05-22 ENCOUNTER — PATIENT MESSAGE (OUTPATIENT)
Dept: OBSTETRICS AND GYNECOLOGY | Facility: CLINIC | Age: 39
End: 2019-05-22

## 2019-05-22 ENCOUNTER — LAB VISIT (OUTPATIENT)
Dept: LAB | Facility: HOSPITAL | Age: 39
End: 2019-05-22
Attending: OBSTETRICS & GYNECOLOGY
Payer: COMMERCIAL

## 2019-05-22 ENCOUNTER — OFFICE VISIT (OUTPATIENT)
Dept: OBSTETRICS AND GYNECOLOGY | Facility: CLINIC | Age: 39
End: 2019-05-22
Payer: COMMERCIAL

## 2019-05-22 VITALS
HEIGHT: 67 IN | DIASTOLIC BLOOD PRESSURE: 58 MMHG | BODY MASS INDEX: 26.99 KG/M2 | SYSTOLIC BLOOD PRESSURE: 98 MMHG | WEIGHT: 171.94 LBS

## 2019-05-22 DIAGNOSIS — R53.83 FATIGUE, UNSPECIFIED TYPE: Primary | ICD-10-CM

## 2019-05-22 DIAGNOSIS — R53.83 FATIGUE, UNSPECIFIED TYPE: ICD-10-CM

## 2019-05-22 DIAGNOSIS — D50.0 IRON DEFICIENCY ANEMIA DUE TO CHRONIC BLOOD LOSS: ICD-10-CM

## 2019-05-22 LAB
ANISOCYTOSIS BLD QL SMEAR: SLIGHT
BASOPHILS # BLD AUTO: 0.02 K/UL (ref 0–0.2)
BASOPHILS NFR BLD: 0.6 % (ref 0–1.9)
DIFFERENTIAL METHOD: ABNORMAL
EOSINOPHIL # BLD AUTO: 0.2 K/UL (ref 0–0.5)
EOSINOPHIL NFR BLD: 4.4 % (ref 0–8)
ERYTHROCYTE [DISTWIDTH] IN BLOOD BY AUTOMATED COUNT: 26.1 % (ref 11.5–14.5)
HCT VFR BLD AUTO: 35.5 % (ref 37–48.5)
HGB BLD-MCNC: 10.3 G/DL (ref 12–16)
LYMPHOCYTES # BLD AUTO: 1.1 K/UL (ref 1–4.8)
LYMPHOCYTES NFR BLD: 30.6 % (ref 18–48)
MCH RBC QN AUTO: 22.9 PG (ref 27–31)
MCHC RBC AUTO-ENTMCNC: 29 G/DL (ref 32–36)
MCV RBC AUTO: 79 FL (ref 82–98)
MONOCYTES # BLD AUTO: 0.3 K/UL (ref 0.3–1)
MONOCYTES NFR BLD: 7.2 % (ref 4–15)
NEUTROPHILS # BLD AUTO: 2.1 K/UL (ref 1.8–7.7)
NEUTROPHILS NFR BLD: 57.2 % (ref 38–73)
OVALOCYTES BLD QL SMEAR: ABNORMAL
PLATELET # BLD AUTO: 300 K/UL (ref 150–350)
PLATELET BLD QL SMEAR: ABNORMAL
PMV BLD AUTO: 10.7 FL (ref 9.2–12.9)
POLYCHROMASIA BLD QL SMEAR: ABNORMAL
RBC # BLD AUTO: 4.5 M/UL (ref 4–5.4)
T4 FREE SERPL-MCNC: 0.93 NG/DL (ref 0.71–1.51)
TSH SERPL DL<=0.005 MIU/L-ACNC: 1.97 UIU/ML (ref 0.4–4)
WBC # BLD AUTO: 3.63 K/UL (ref 3.9–12.7)

## 2019-05-22 PROCEDURE — 99211 PR OFFICE/OUTPT VISIT, EST, LEVL I: ICD-10-PCS | Mod: S$GLB,,, | Performed by: OBSTETRICS & GYNECOLOGY

## 2019-05-22 PROCEDURE — 85025 COMPLETE CBC W/AUTO DIFF WBC: CPT

## 2019-05-22 PROCEDURE — 99211 OFF/OP EST MAY X REQ PHY/QHP: CPT | Mod: S$GLB,,, | Performed by: OBSTETRICS & GYNECOLOGY

## 2019-05-22 PROCEDURE — 36415 COLL VENOUS BLD VENIPUNCTURE: CPT

## 2019-05-22 PROCEDURE — 99999 PR PBB SHADOW E&M-EST. PATIENT-LVL III: ICD-10-PCS | Mod: PBBFAC,,, | Performed by: OBSTETRICS & GYNECOLOGY

## 2019-05-22 PROCEDURE — 99999 PR PBB SHADOW E&M-EST. PATIENT-LVL III: CPT | Mod: PBBFAC,,, | Performed by: OBSTETRICS & GYNECOLOGY

## 2019-05-22 PROCEDURE — 84443 ASSAY THYROID STIM HORMONE: CPT

## 2019-05-22 PROCEDURE — 84439 ASSAY OF FREE THYROXINE: CPT

## 2019-05-22 RX ORDER — MUPIROCIN 20 MG/G
OINTMENT TOPICAL
Refills: 3 | COMMUNITY
Start: 2019-05-13 | End: 2020-06-15

## 2019-05-22 NOTE — PROGRESS NOTES
"OBSTETRIC HISTORY:   OB History        3    Para        Term                AB   1    Living   2       SAB   0    TAB   1    Ectopic        Multiple        Live Births                      COMPREHENSIVE GYN HISTORY:  PAP History: Denies abnormal Paps.  Infection History: Denies STDs. Denies PID.  Benign History: Denies uterine fibroids. Denies ovarian cysts. Denies endometriosis.   Cancer History: Denies cervical cancer. Denies uterine cancer or hyperplasia. Denies ovarian cancer. Denies vulvar cancer or pre-cancer. Denies vaginal cancer or pre-cancer. Denies breast cancer. Denies colon cancer.  Sexual Activity History:   reports that she currently engages in sexual activity and has had male partners. She reports using the following method of birth control/protection: None.   Menstrual History: Every 21 days, flows for 5-7 days. Normal flow.  Dysmenorrhea History: Reports occ. dysmenorrhea.  Contraception: None       HPI:   38 y.o.  Patient's last menstrual period was 2019.   Patient is  here for follow up after admitted for blood transfusion 2/2 anemia, hair loss and one episode of right lower sharp pain after coughing (pain resolved quickly--sounds like round ligament pain). She denies bladder, breast and bowel complaints.    ROS:  GENERAL: Denies weight gain or weight loss. Feeling well overall.   SKIN: Denies rash or lesions.   HEAD: Denies headache.   NODES: Denies enlarged lymph nodes.   CHEST: Denies shortness of breath.   ABDOMEN: No abdominal pain, constipation, diarrhea, nausea, vomiting or rectal bleeding.   URINARY: No frequency, dysuria, hematuria, or burning on urination.  REPRODUCTIVE: See HPI.   BREASTS: The patient denies pain, lumps, or nipple discharge.   HEMATOLOGIC: No easy bruisability.   MUSCULOSKELETAL: Denies joint pain or back pain.   NEUROLOGIC: Denies weakness.   PSYCHIATRIC: Denies depression, anxiety or mood swings.    PE:   BP (!) 98/58   Ht 5' 7" (1.702 m)   Wt " 78 kg (171 lb 15.3 oz)   LMP 05/14/2019   BMI 26.93 kg/m²   APPEARANCE: Well nourished, well developed, in no acute distress.  Talk only face to face time 3 minutes      ASSESSMENT:  Fatigue--TSH and Free T4  Hair loss-- normal for recent pregnancy  Recent blood loss anemia--recheck CBC    PLAN:  RTO prn

## 2019-05-23 NOTE — TELEPHONE ENCOUNTER
----- Message from Rosy Merrill sent at 5/23/2019 10:07 AM CDT -----  Contact: self [phone]  Patient would like a call with test results.

## 2019-05-24 ENCOUNTER — PATIENT MESSAGE (OUTPATIENT)
Dept: OBSTETRICS AND GYNECOLOGY | Facility: CLINIC | Age: 39
End: 2019-05-24

## 2019-06-26 ENCOUNTER — LAB VISIT (OUTPATIENT)
Dept: LAB | Facility: HOSPITAL | Age: 39
End: 2019-06-26
Attending: FAMILY MEDICINE
Payer: COMMERCIAL

## 2019-06-26 ENCOUNTER — OFFICE VISIT (OUTPATIENT)
Dept: FAMILY MEDICINE | Facility: CLINIC | Age: 39
End: 2019-06-26
Attending: FAMILY MEDICINE
Payer: COMMERCIAL

## 2019-06-26 VITALS
DIASTOLIC BLOOD PRESSURE: 68 MMHG | HEART RATE: 70 BPM | SYSTOLIC BLOOD PRESSURE: 104 MMHG | WEIGHT: 170 LBS | OXYGEN SATURATION: 97 % | HEIGHT: 67 IN | BODY MASS INDEX: 26.68 KG/M2

## 2019-06-26 DIAGNOSIS — D50.0 IRON DEFICIENCY ANEMIA DUE TO CHRONIC BLOOD LOSS: ICD-10-CM

## 2019-06-26 DIAGNOSIS — Z00.00 ROUTINE GENERAL MEDICAL EXAMINATION AT A HEALTH CARE FACILITY: ICD-10-CM

## 2019-06-26 DIAGNOSIS — Z00.00 ROUTINE GENERAL MEDICAL EXAMINATION AT A HEALTH CARE FACILITY: Primary | ICD-10-CM

## 2019-06-26 LAB
25(OH)D3+25(OH)D2 SERPL-MCNC: 17 NG/ML (ref 30–96)
ALBUMIN SERPL BCP-MCNC: 4.1 G/DL (ref 3.5–5.2)
ALP SERPL-CCNC: 54 U/L (ref 55–135)
ALT SERPL W/O P-5'-P-CCNC: 13 U/L (ref 10–44)
ANION GAP SERPL CALC-SCNC: 7 MMOL/L (ref 8–16)
ANISOCYTOSIS BLD QL SMEAR: SLIGHT
AST SERPL-CCNC: 16 U/L (ref 10–40)
BASOPHILS # BLD AUTO: 0.01 K/UL (ref 0–0.2)
BASOPHILS NFR BLD: 0.2 % (ref 0–1.9)
BILIRUB SERPL-MCNC: 0.6 MG/DL (ref 0.1–1)
BUN SERPL-MCNC: 13 MG/DL (ref 6–20)
CALCIUM SERPL-MCNC: 9.4 MG/DL (ref 8.7–10.5)
CHLORIDE SERPL-SCNC: 106 MMOL/L (ref 95–110)
CHOLEST SERPL-MCNC: 190 MG/DL (ref 120–199)
CHOLEST/HDLC SERPL: 3 {RATIO} (ref 2–5)
CO2 SERPL-SCNC: 28 MMOL/L (ref 23–29)
CREAT SERPL-MCNC: 0.7 MG/DL (ref 0.5–1.4)
DIFFERENTIAL METHOD: ABNORMAL
EOSINOPHIL # BLD AUTO: 0.1 K/UL (ref 0–0.5)
EOSINOPHIL NFR BLD: 2.2 % (ref 0–8)
ERYTHROCYTE [DISTWIDTH] IN BLOOD BY AUTOMATED COUNT: 22.7 % (ref 11.5–14.5)
EST. GFR  (AFRICAN AMERICAN): >60 ML/MIN/1.73 M^2
EST. GFR  (NON AFRICAN AMERICAN): >60 ML/MIN/1.73 M^2
FERRITIN SERPL-MCNC: 8 NG/ML (ref 20–300)
GLUCOSE SERPL-MCNC: 82 MG/DL (ref 70–110)
HCT VFR BLD AUTO: 39.5 % (ref 37–48.5)
HDLC SERPL-MCNC: 63 MG/DL (ref 40–75)
HDLC SERPL: 33.2 % (ref 20–50)
HGB BLD-MCNC: 12.3 G/DL (ref 12–16)
IRON SERPL-MCNC: 99 UG/DL (ref 30–160)
LDLC SERPL CALC-MCNC: 114.6 MG/DL (ref 63–159)
LYMPHOCYTES # BLD AUTO: 1.1 K/UL (ref 1–4.8)
LYMPHOCYTES NFR BLD: 27.4 % (ref 18–48)
MCH RBC QN AUTO: 25.9 PG (ref 27–31)
MCHC RBC AUTO-ENTMCNC: 31.1 G/DL (ref 32–36)
MCV RBC AUTO: 83 FL (ref 82–98)
MONOCYTES # BLD AUTO: 0.3 K/UL (ref 0.3–1)
MONOCYTES NFR BLD: 7.3 % (ref 4–15)
NEUTROPHILS # BLD AUTO: 2.6 K/UL (ref 1.8–7.7)
NEUTROPHILS NFR BLD: 62.9 % (ref 38–73)
NONHDLC SERPL-MCNC: 127 MG/DL
PLATELET # BLD AUTO: 213 K/UL (ref 150–350)
PLATELET BLD QL SMEAR: ABNORMAL
PMV BLD AUTO: 10 FL (ref 9.2–12.9)
POIKILOCYTOSIS BLD QL SMEAR: SLIGHT
POTASSIUM SERPL-SCNC: 4 MMOL/L (ref 3.5–5.1)
PROT SERPL-MCNC: 7.1 G/DL (ref 6–8.4)
RBC # BLD AUTO: 4.74 M/UL (ref 4–5.4)
SATURATED IRON: 20 % (ref 20–50)
SODIUM SERPL-SCNC: 141 MMOL/L (ref 136–145)
TOTAL IRON BINDING CAPACITY: 500 UG/DL (ref 250–450)
TRANSFERRIN SERPL-MCNC: 338 MG/DL (ref 200–375)
TRIGL SERPL-MCNC: 62 MG/DL (ref 30–150)
TSH SERPL DL<=0.005 MIU/L-ACNC: 1.69 UIU/ML (ref 0.4–4)
WBC # BLD AUTO: 4.09 K/UL (ref 3.9–12.7)

## 2019-06-26 PROCEDURE — 85025 COMPLETE CBC W/AUTO DIFF WBC: CPT

## 2019-06-26 PROCEDURE — 82728 ASSAY OF FERRITIN: CPT

## 2019-06-26 PROCEDURE — 99999 PR PBB SHADOW E&M-EST. PATIENT-LVL III: CPT | Mod: PBBFAC,,, | Performed by: FAMILY MEDICINE

## 2019-06-26 PROCEDURE — 36415 COLL VENOUS BLD VENIPUNCTURE: CPT

## 2019-06-26 PROCEDURE — 83540 ASSAY OF IRON: CPT

## 2019-06-26 PROCEDURE — 99385 PR PREVENTIVE VISIT,NEW,18-39: ICD-10-PCS | Mod: S$GLB,,, | Performed by: FAMILY MEDICINE

## 2019-06-26 PROCEDURE — 80061 LIPID PANEL: CPT

## 2019-06-26 PROCEDURE — 80053 COMPREHEN METABOLIC PANEL: CPT

## 2019-06-26 PROCEDURE — 82306 VITAMIN D 25 HYDROXY: CPT

## 2019-06-26 PROCEDURE — 99385 PREV VISIT NEW AGE 18-39: CPT | Mod: S$GLB,,, | Performed by: FAMILY MEDICINE

## 2019-06-26 PROCEDURE — 84443 ASSAY THYROID STIM HORMONE: CPT

## 2019-06-26 PROCEDURE — 99999 PR PBB SHADOW E&M-EST. PATIENT-LVL III: ICD-10-PCS | Mod: PBBFAC,,, | Performed by: FAMILY MEDICINE

## 2019-06-26 NOTE — PROGRESS NOTES
Subjective:       Patient ID: Meri Cosme is a 38 y.o. female.    Chief Complaint: Annual Exam and Establish Care    38 yr old pleasant female with anemia iron deficiency, and no other significant medical history presents today as new patient and establishing primary care and her annual wellness check and lab work. No complaints today.    Iron deficiency anemia - chronic - has heavy cycles - she had issues recently and needed transfusion - her H/H is table and improving now - asymptomatic       She exercises regularly and eats healthy      History as below - reviewed in detail      HM  -declined vaccines  -labs due    Anemia   Presents for follow-up visit. There has been no abdominal pain, bruising/bleeding easily, confusion, fever, light-headedness, pallor, palpitations, pica or weight loss. Signs of blood loss that are not present include hematemesis, melena and vaginal bleeding. Past treatments include changes in diet and oral iron supplements. There is no history of alcohol abuse, cancer, chronic renal disease, dementia, heart failure, HIV/AIDS, inflammatory bowel disease, malabsorption, neuropathy, recent surgery or rheumatic disease. There is no past history of bone marrow exam, EGD or FOBT. Compliance with medications is %.     Review of Systems   Constitutional: Negative.  Negative for activity change, diaphoresis, fever, unexpected weight change and weight loss.   HENT: Negative.  Negative for congestion, ear discharge, hearing loss, rhinorrhea, sore throat and voice change.    Eyes: Negative.  Negative for pain, discharge and visual disturbance.   Respiratory: Negative.  Negative for chest tightness, shortness of breath and wheezing.    Cardiovascular: Negative.  Negative for chest pain and palpitations.   Gastrointestinal: Negative.  Negative for abdominal distention, abdominal pain, anal bleeding, constipation, hematemesis, melena and nausea.   Endocrine: Negative.  Negative for cold intolerance,  polydipsia and polyuria.   Genitourinary: Negative.  Negative for decreased urine volume, difficulty urinating, dysuria, frequency, menstrual problem, vaginal bleeding and vaginal pain.   Musculoskeletal: Negative.  Negative for arthralgias, gait problem and myalgias.   Skin: Negative.  Negative for color change, pallor and wound.   Allergic/Immunologic: Negative.  Negative for environmental allergies and immunocompromised state.   Neurological: Negative.  Negative for dizziness, tremors, seizures, speech difficulty, light-headedness and headaches.   Hematological: Negative.  Negative for adenopathy. Does not bruise/bleed easily.   Psychiatric/Behavioral: Negative.  Negative for agitation, confusion, decreased concentration, hallucinations, self-injury and suicidal ideas. The patient is not nervous/anxious.        Active Ambulatory Problems     Diagnosis Date Noted    History of syncope 2015    Chest pain, central 2015    BMI 25.0-25.9,adult 2015    Syncope and collapse 2015    Chest pain 2015    Pre-syncope 2015     Resolved Ambulatory Problems     Diagnosis Date Noted    Symptomatic anemia 2019     Past Medical History:   Diagnosis Date    Heart disease      Past Surgical History:   Procedure Laterality Date    BREAST RECONSTRUCTION  2014    lift     SECTION       Family History   Problem Relation Age of Onset    Heart disease Mother     Heart disease Father     Melanoma Neg Hx     Breast cancer Neg Hx     Colon cancer Neg Hx     Ovarian cancer Neg Hx      Social History     Socioeconomic History    Marital status:      Spouse name: Not on file    Number of children: 2    Years of education: Not on file    Highest education level: Not on file   Occupational History    Not on file   Social Needs    Financial resource strain: Not on file    Food insecurity:     Worry: Not on file     Inability: Not on file    Transportation needs:      Medical: Not on file     Non-medical: Not on file   Tobacco Use    Smoking status: Current Some Day Smoker    Smokeless tobacco: Never Used    Tobacco comment: light smoker   Substance and Sexual Activity    Alcohol use: Yes     Comment: ocacsional    Drug use: No    Sexual activity: Yes     Partners: Male     Birth control/protection: None   Lifestyle    Physical activity:     Days per week: Not on file     Minutes per session: Not on file    Stress: Not on file   Relationships    Social connections:     Talks on phone: Not on file     Gets together: Not on file     Attends [employer] service: Not on file     Active member of club or organization: Not on file     Attends meetings of clubs or organizations: Not on file     Relationship status: Not on file   Other Topics Concern    Are you pregnant or think you may be? Not Asked    Breast-feeding Not Asked   Social History Narrative    Not on file     Review of patient's allergies indicates:  No Known Allergies  Current Outpatient Medications on File Prior to Visit   Medication Sig Dispense Refill    ferrous sulfate (FEOSOL) 325 mg (65 mg iron) Tab tablet Take 1 tablet (325 mg total) by mouth daily with breakfast. 30 tablet 11    mupirocin (BACTROBAN) 2 % ointment   3     No current facility-administered medications on file prior to visit.        Objective:       Vitals:    06/26/19 1447   BP: 104/68   Pulse: 70       Physical Exam   Constitutional: She is oriented to person, place, and time. She appears well-developed and well-nourished. No distress.   HENT:   Head: Normocephalic and atraumatic.   Right Ear: External ear normal.   Left Ear: External ear normal.   Nose: Nose normal.   Mouth/Throat: Oropharynx is clear and moist. No oropharyngeal exudate.   Eyes: Pupils are equal, round, and reactive to light. Conjunctivae and EOM are normal. Right eye exhibits no discharge. Left eye exhibits no discharge. No scleral icterus.   Neck: Normal range of  motion. Neck supple. No JVD present. No tracheal deviation present. No thyromegaly present.   Cardiovascular: Normal rate, regular rhythm, normal heart sounds and intact distal pulses. Exam reveals no gallop and no friction rub.   No murmur heard.  Pulmonary/Chest: Effort normal and breath sounds normal. No stridor. She has no wheezes. She has no rales. She exhibits no tenderness.   Abdominal: Soft. Bowel sounds are normal. She exhibits no distension and no mass. There is no tenderness. There is no rebound and no guarding. No hernia.   Musculoskeletal: Normal range of motion. She exhibits no edema or tenderness.   Lymphadenopathy:     She has no cervical adenopathy.   Neurological: She is alert and oriented to person, place, and time. She has normal reflexes. She displays normal reflexes. No cranial nerve deficit. She exhibits normal muscle tone. Coordination normal.   Skin: Skin is warm and dry. No rash noted. She is not diaphoretic. No erythema. No pallor.   Psychiatric: She has a normal mood and affect. Her behavior is normal. Judgment and thought content normal.       Assessment:       1. Routine general medical examination at a health care facility    2. Iron deficiency anemia due to chronic blood loss        Plan:       Meri was seen today for annual exam and establish care.    Diagnoses and all orders for this visit:    Routine general medical examination at a health care facility  -     CBC auto differential; Future  -     Comprehensive metabolic panel; Future  -     Lipid panel; Future  -     Iron and TIBC; Future  -     Ferritin; Future  -     Vitamin D; Future  -     TSH; Future    Iron deficiency anemia due to chronic blood loss  -     CBC auto differential; Future  -     Iron and TIBC; Future  -     Ferritin; Future      Wellness check  -normal exam  -labs    Anemia  -continue iron supplement  -lab repeat    Spent adequate time in obtaining history and explaining differentials    40 minutes spent during  this visit of which greater than 50% devoted to face-face counseling and coordination of care regarding diagnosis and management plan    Follow up in about 1 year (around 6/26/2020), or if symptoms worsen or fail to improve.

## 2019-10-01 ENCOUNTER — OFFICE VISIT (OUTPATIENT)
Dept: FAMILY MEDICINE | Facility: CLINIC | Age: 39
End: 2019-10-01
Attending: FAMILY MEDICINE
Payer: COMMERCIAL

## 2019-10-01 ENCOUNTER — LAB VISIT (OUTPATIENT)
Dept: LAB | Facility: HOSPITAL | Age: 39
End: 2019-10-01
Attending: FAMILY MEDICINE
Payer: COMMERCIAL

## 2019-10-01 VITALS
HEIGHT: 67 IN | OXYGEN SATURATION: 99 % | BODY MASS INDEX: 27.96 KG/M2 | HEART RATE: 62 BPM | WEIGHT: 178.13 LBS | SYSTOLIC BLOOD PRESSURE: 110 MMHG | DIASTOLIC BLOOD PRESSURE: 62 MMHG

## 2019-10-01 DIAGNOSIS — Z83.3 FAMILY HISTORY OF DIABETES MELLITUS (DM): ICD-10-CM

## 2019-10-01 DIAGNOSIS — D50.9 IRON DEFICIENCY ANEMIA, UNSPECIFIED IRON DEFICIENCY ANEMIA TYPE: Primary | ICD-10-CM

## 2019-10-01 DIAGNOSIS — E55.9 VITAMIN D DEFICIENCY: ICD-10-CM

## 2019-10-01 DIAGNOSIS — D50.9 IRON DEFICIENCY ANEMIA, UNSPECIFIED IRON DEFICIENCY ANEMIA TYPE: ICD-10-CM

## 2019-10-01 LAB
ALBUMIN SERPL BCP-MCNC: 4.4 G/DL (ref 3.5–5.2)
ALP SERPL-CCNC: 67 U/L (ref 55–135)
ALT SERPL W/O P-5'-P-CCNC: 12 U/L (ref 10–44)
ANION GAP SERPL CALC-SCNC: 9 MMOL/L (ref 8–16)
AST SERPL-CCNC: 16 U/L (ref 10–40)
BASOPHILS # BLD AUTO: 0.02 K/UL (ref 0–0.2)
BASOPHILS NFR BLD: 0.4 % (ref 0–1.9)
BILIRUB SERPL-MCNC: 1 MG/DL (ref 0.1–1)
BUN SERPL-MCNC: 10 MG/DL (ref 6–20)
CALCIUM SERPL-MCNC: 9.5 MG/DL (ref 8.7–10.5)
CHLORIDE SERPL-SCNC: 105 MMOL/L (ref 95–110)
CHOLEST SERPL-MCNC: 175 MG/DL (ref 120–199)
CHOLEST/HDLC SERPL: 3.3 {RATIO} (ref 2–5)
CO2 SERPL-SCNC: 26 MMOL/L (ref 23–29)
CREAT SERPL-MCNC: 0.8 MG/DL (ref 0.5–1.4)
DIFFERENTIAL METHOD: NORMAL
EOSINOPHIL # BLD AUTO: 0.2 K/UL (ref 0–0.5)
EOSINOPHIL NFR BLD: 3.4 % (ref 0–8)
ERYTHROCYTE [DISTWIDTH] IN BLOOD BY AUTOMATED COUNT: 13.4 % (ref 11.5–14.5)
EST. GFR  (AFRICAN AMERICAN): >60 ML/MIN/1.73 M^2
EST. GFR  (NON AFRICAN AMERICAN): >60 ML/MIN/1.73 M^2
ESTIMATED AVG GLUCOSE: 80 MG/DL (ref 68–131)
FERRITIN SERPL-MCNC: 7 NG/ML (ref 20–300)
GLUCOSE SERPL-MCNC: 81 MG/DL (ref 70–110)
HBA1C MFR BLD HPLC: 4.4 % (ref 4–5.6)
HCT VFR BLD AUTO: 40.5 % (ref 37–48.5)
HDLC SERPL-MCNC: 53 MG/DL (ref 40–75)
HDLC SERPL: 30.3 % (ref 20–50)
HGB BLD-MCNC: 13 G/DL (ref 12–16)
IRON SERPL-MCNC: 86 UG/DL (ref 30–160)
LDLC SERPL CALC-MCNC: 111 MG/DL (ref 63–159)
LYMPHOCYTES # BLD AUTO: 1.1 K/UL (ref 1–4.8)
LYMPHOCYTES NFR BLD: 22.9 % (ref 18–48)
MCH RBC QN AUTO: 29 PG (ref 27–31)
MCHC RBC AUTO-ENTMCNC: 32.1 G/DL (ref 32–36)
MCV RBC AUTO: 90 FL (ref 82–98)
MONOCYTES # BLD AUTO: 0.3 K/UL (ref 0.3–1)
MONOCYTES NFR BLD: 6.4 % (ref 4–15)
NEUTROPHILS # BLD AUTO: 3.1 K/UL (ref 1.8–7.7)
NEUTROPHILS NFR BLD: 66.9 % (ref 38–73)
NONHDLC SERPL-MCNC: 122 MG/DL
PLATELET # BLD AUTO: 231 K/UL (ref 150–350)
PMV BLD AUTO: 11.4 FL (ref 9.2–12.9)
POTASSIUM SERPL-SCNC: 3.8 MMOL/L (ref 3.5–5.1)
PROT SERPL-MCNC: 7.5 G/DL (ref 6–8.4)
RBC # BLD AUTO: 4.48 M/UL (ref 4–5.4)
SATURATED IRON: 18 % (ref 20–50)
SODIUM SERPL-SCNC: 140 MMOL/L (ref 136–145)
TOTAL IRON BINDING CAPACITY: 480 UG/DL (ref 250–450)
TRANSFERRIN SERPL-MCNC: 324 MG/DL (ref 200–375)
TRIGL SERPL-MCNC: 55 MG/DL (ref 30–150)
WBC # BLD AUTO: 4.67 K/UL (ref 3.9–12.7)

## 2019-10-01 PROCEDURE — 83540 ASSAY OF IRON: CPT

## 2019-10-01 PROCEDURE — 99214 OFFICE O/P EST MOD 30 MIN: CPT | Mod: S$GLB,,, | Performed by: FAMILY MEDICINE

## 2019-10-01 PROCEDURE — 80053 COMPREHEN METABOLIC PANEL: CPT

## 2019-10-01 PROCEDURE — 36415 COLL VENOUS BLD VENIPUNCTURE: CPT

## 2019-10-01 PROCEDURE — 82728 ASSAY OF FERRITIN: CPT

## 2019-10-01 PROCEDURE — 82306 VITAMIN D 25 HYDROXY: CPT

## 2019-10-01 PROCEDURE — 99999 PR PBB SHADOW E&M-EST. PATIENT-LVL III: ICD-10-PCS | Mod: PBBFAC,,, | Performed by: FAMILY MEDICINE

## 2019-10-01 PROCEDURE — 80061 LIPID PANEL: CPT

## 2019-10-01 PROCEDURE — 99214 PR OFFICE/OUTPT VISIT, EST, LEVL IV, 30-39 MIN: ICD-10-PCS | Mod: S$GLB,,, | Performed by: FAMILY MEDICINE

## 2019-10-01 PROCEDURE — 99999 PR PBB SHADOW E&M-EST. PATIENT-LVL III: CPT | Mod: PBBFAC,,, | Performed by: FAMILY MEDICINE

## 2019-10-01 PROCEDURE — 3008F BODY MASS INDEX DOCD: CPT | Mod: CPTII,S$GLB,, | Performed by: FAMILY MEDICINE

## 2019-10-01 PROCEDURE — 83036 HEMOGLOBIN GLYCOSYLATED A1C: CPT

## 2019-10-01 PROCEDURE — 85025 COMPLETE CBC W/AUTO DIFF WBC: CPT

## 2019-10-01 PROCEDURE — 3008F PR BODY MASS INDEX (BMI) DOCUMENTED: ICD-10-PCS | Mod: CPTII,S$GLB,, | Performed by: FAMILY MEDICINE

## 2019-10-01 NOTE — PROGRESS NOTES
Subjective:       Patient ID: Meri Cosme is a 38 y.o. female.    Chief Complaint: Follow-up    38 yr old pleasant female with family h/o DM, anemia iron deficiency, and no other significant medical history presents today for 3 month check and lab work. No complaints today.    Iron deficiency anemia - chronic - has heavy cycles - she had issues recently and needed transfusion - her H/H is table and improving now - asymptomatic       She exercises regularly and eats healthy      History as below - reviewed in detail        -declined vaccines  -labs due    Anemia   Presents for follow-up visit. There has been no abdominal pain, bruising/bleeding easily, confusion, fever, light-headedness, pallor, palpitations, pica or weight loss. Signs of blood loss that are not present include hematemesis, melena and vaginal bleeding. Past treatments include changes in diet and oral iron supplements. There is no history of alcohol abuse, cancer, chronic renal disease, dementia, heart failure, HIV/AIDS, inflammatory bowel disease, malabsorption, neuropathy, recent surgery or rheumatic disease. There is no past history of bone marrow exam, EGD or FOBT. Compliance with medications is %.     Review of Systems   Constitutional: Negative.  Negative for activity change, diaphoresis, fever, unexpected weight change and weight loss.   HENT: Negative.  Negative for congestion, ear discharge, hearing loss, rhinorrhea, sore throat, trouble swallowing and voice change.    Eyes: Negative.  Negative for pain, discharge and visual disturbance.   Respiratory: Negative.  Negative for chest tightness, shortness of breath and wheezing.    Cardiovascular: Negative.  Negative for chest pain and palpitations.   Gastrointestinal: Negative.  Negative for abdominal distention, abdominal pain, anal bleeding, blood in stool, constipation, diarrhea, hematemesis, melena, nausea and vomiting.   Endocrine: Negative.  Negative for cold intolerance,  polydipsia and polyuria.   Genitourinary: Negative.  Negative for decreased urine volume, difficulty urinating, dysuria, frequency, hematuria, menstrual problem, vaginal bleeding and vaginal pain.   Musculoskeletal: Negative.  Negative for arthralgias, gait problem, joint swelling, myalgias and neck pain.   Skin: Negative.  Negative for color change, pallor and wound.   Allergic/Immunologic: Negative.  Negative for environmental allergies and immunocompromised state.   Neurological: Negative.  Negative for dizziness, tremors, seizures, speech difficulty, weakness, light-headedness and headaches.   Hematological: Negative.  Negative for adenopathy. Does not bruise/bleed easily.   Psychiatric/Behavioral: Negative.  Negative for agitation, confusion, decreased concentration, dysphoric mood, hallucinations, self-injury and suicidal ideas. The patient is not nervous/anxious.        PMH/PSH/FH/SH/MED/ALLERGY reviewed    Objective:       Vitals:    10/01/19 1402   BP: 110/62   Pulse: 62       Physical Exam   Constitutional: She is oriented to person, place, and time. She appears well-developed and well-nourished. No distress.   HENT:   Head: Normocephalic and atraumatic.   Right Ear: External ear normal.   Left Ear: External ear normal.   Nose: Nose normal.   Mouth/Throat: Oropharynx is clear and moist. No oropharyngeal exudate.   Eyes: Pupils are equal, round, and reactive to light. Conjunctivae and EOM are normal. Right eye exhibits no discharge. Left eye exhibits no discharge. No scleral icterus.   Neck: Normal range of motion. Neck supple. No JVD present. No tracheal deviation present. No thyromegaly present.   Cardiovascular: Normal rate, regular rhythm, normal heart sounds and intact distal pulses. Exam reveals no gallop and no friction rub.   No murmur heard.  Pulmonary/Chest: Effort normal and breath sounds normal. No stridor. She has no wheezes. She has no rales. She exhibits no tenderness.   Abdominal: Soft.  Bowel sounds are normal. She exhibits no distension and no mass. There is no tenderness. There is no rebound and no guarding. No hernia.   Musculoskeletal: Normal range of motion. She exhibits no edema or tenderness.   Lymphadenopathy:     She has no cervical adenopathy.   Neurological: She is alert and oriented to person, place, and time. She has normal reflexes. She displays normal reflexes. No cranial nerve deficit. She exhibits normal muscle tone. Coordination normal.   Skin: Skin is warm and dry. No rash noted. She is not diaphoretic. No erythema. No pallor.   Psychiatric: She has a normal mood and affect. Her behavior is normal. Judgment and thought content normal.       Assessment:       1. Iron deficiency anemia, unspecified iron deficiency anemia type    2. Vitamin D deficiency    3. Family history of diabetes mellitus (DM)        Plan:           Meri was seen today for follow-up.    Diagnoses and all orders for this visit:    Iron deficiency anemia, unspecified iron deficiency anemia type  -     Iron and TIBC; Future  -     Ferritin; Future    Vitamin D deficiency  -     Lipid panel; Future  -     Comprehensive metabolic panel; Future  -     Vitamin D; Future  -     CBC auto differential; Future    Family history of diabetes mellitus (DM)  -     Hemoglobin A1c; Future      Anemia  -lab repeat  -continue iron    Vit D def  -replacement OTC  -lab    A1C to r/o prediabetes    Spent adequate time in obtaining history and explaining differentials    40 minutes spent during this visit of which greater than 50% devoted to face-face counseling and coordination of care regarding diagnosis and management plan    Follow up in about 8 months (around 6/1/2020), or if symptoms worsen or fail to improve.

## 2019-10-02 LAB — 25(OH)D3+25(OH)D2 SERPL-MCNC: 17 NG/ML (ref 30–96)

## 2019-12-26 ENCOUNTER — TELEPHONE (OUTPATIENT)
Dept: FAMILY MEDICINE | Facility: CLINIC | Age: 39
End: 2019-12-26

## 2019-12-26 NOTE — TELEPHONE ENCOUNTER
Spoke to pt and states that she's experiencing ear pressure in her right ear. Need appointment for tomorrow, if possible. Informed pt that Dr. Alston is out of the office this week. Pt was offered an appt with another physician, but did not want to schedule due to her insurance not covering all the physicians at [facility]. Pt will call her insurance and find out which doctors are covered and schedule an appt when she calls back.

## 2019-12-26 NOTE — TELEPHONE ENCOUNTER
----- Message from Ainsley Wesley sent at 12/26/2019  2:56 PM CST -----  Contact: self  Type:  Appointment Request    Name of Caller   Patient states experiencing ear pressure  In right ear need appointment for tomorrow if possible   When is the first available appointment?  Symptoms  Would the patient rather a call back or a response via [facility]?call  Best Call Back Number:[phone]  Additional Information: I was unable to schedule any appointment for Shira Jha

## 2020-06-02 ENCOUNTER — TELEPHONE (OUTPATIENT)
Dept: OBSTETRICS AND GYNECOLOGY | Facility: CLINIC | Age: 40
End: 2020-06-02

## 2020-06-02 NOTE — TELEPHONE ENCOUNTER
----- Message from Kathleen Liriano sent at 6/2/2020 12:58 PM CDT -----  Contact: Self  Pt is calling to speak with Staff regarding being scheduled for an appt, possible today because she is unsure her insurance will last much longer.  In addition, she say she has an issued.    She can be reached at [phone].    Thank you.

## 2020-06-02 NOTE — TELEPHONE ENCOUNTER
Called patient, patient stated she was calling to schedule an appointment for her annual and she is having some pelvic pain. Appointment scheduled for 6/8/20 at 10:30am. Patient verbalized understanding.

## 2020-06-04 ENCOUNTER — PATIENT OUTREACH (OUTPATIENT)
Dept: ADMINISTRATIVE | Facility: OTHER | Age: 40
End: 2020-06-04

## 2020-06-08 ENCOUNTER — TELEPHONE (OUTPATIENT)
Dept: OBSTETRICS AND GYNECOLOGY | Facility: CLINIC | Age: 40
End: 2020-06-08

## 2020-06-08 NOTE — TELEPHONE ENCOUNTER
----- Message from Yaima Fleming sent at 6/8/2020  9:24 AM CDT -----  Contact: pt  Pt called needs an appointment next week please advise pt    Pt can be reached at [phone]

## 2020-06-12 ENCOUNTER — PATIENT OUTREACH (OUTPATIENT)
Dept: ADMINISTRATIVE | Facility: OTHER | Age: 40
End: 2020-06-12

## 2020-06-15 ENCOUNTER — PROCEDURE VISIT (OUTPATIENT)
Dept: OBSTETRICS AND GYNECOLOGY | Facility: CLINIC | Age: 40
End: 2020-06-15
Payer: COMMERCIAL

## 2020-06-15 ENCOUNTER — LAB VISIT (OUTPATIENT)
Dept: LAB | Facility: HOSPITAL | Age: 40
End: 2020-06-15
Attending: OBSTETRICS & GYNECOLOGY
Payer: COMMERCIAL

## 2020-06-15 ENCOUNTER — OFFICE VISIT (OUTPATIENT)
Dept: OBSTETRICS AND GYNECOLOGY | Facility: CLINIC | Age: 40
End: 2020-06-15
Payer: COMMERCIAL

## 2020-06-15 VITALS
SYSTOLIC BLOOD PRESSURE: 108 MMHG | BODY MASS INDEX: 31.21 KG/M2 | DIASTOLIC BLOOD PRESSURE: 70 MMHG | HEIGHT: 67 IN | WEIGHT: 198.88 LBS

## 2020-06-15 DIAGNOSIS — R14.0 BLOATING: ICD-10-CM

## 2020-06-15 DIAGNOSIS — N94.89 ADNEXAL MASS: ICD-10-CM

## 2020-06-15 DIAGNOSIS — N93.9 ABNORMAL UTERINE BLEEDING (AUB): ICD-10-CM

## 2020-06-15 DIAGNOSIS — Z01.419 WELL WOMAN EXAM WITH ROUTINE GYNECOLOGICAL EXAM: Primary | ICD-10-CM

## 2020-06-15 DIAGNOSIS — E34.9 HORMONE DISORDER: ICD-10-CM

## 2020-06-15 DIAGNOSIS — Z12.4 ROUTINE CERVICAL SMEAR: ICD-10-CM

## 2020-06-15 LAB
ESTRADIOL SERPL-MCNC: 88 PG/ML
FSH SERPL-ACNC: 4.7 MIU/ML
LH SERPL-ACNC: 2.8 MIU/ML

## 2020-06-15 PROCEDURE — 87624 HPV HI-RISK TYP POOLED RSLT: CPT

## 2020-06-15 PROCEDURE — 76830 TRANSVAGINAL US NON-OB: CPT | Mod: S$GLB,,, | Performed by: OBSTETRICS & GYNECOLOGY

## 2020-06-15 PROCEDURE — 83001 ASSAY OF GONADOTROPIN (FSH): CPT

## 2020-06-15 PROCEDURE — 76856 US EXAM PELVIC COMPLETE: CPT | Mod: S$GLB,,, | Performed by: OBSTETRICS & GYNECOLOGY

## 2020-06-15 PROCEDURE — 99999 PR PBB SHADOW E&M-EST. PATIENT-LVL III: ICD-10-PCS | Mod: PBBFAC,,, | Performed by: OBSTETRICS & GYNECOLOGY

## 2020-06-15 PROCEDURE — 83002 ASSAY OF GONADOTROPIN (LH): CPT

## 2020-06-15 PROCEDURE — 88175 CYTOPATH C/V AUTO FLUID REDO: CPT

## 2020-06-15 PROCEDURE — 99395 PR PREVENTIVE VISIT,EST,18-39: ICD-10-PCS | Mod: 25,S$GLB,, | Performed by: OBSTETRICS & GYNECOLOGY

## 2020-06-15 PROCEDURE — 99999 PR PBB SHADOW E&M-EST. PATIENT-LVL III: CPT | Mod: PBBFAC,,, | Performed by: OBSTETRICS & GYNECOLOGY

## 2020-06-15 PROCEDURE — 99395 PREV VISIT EST AGE 18-39: CPT | Mod: 25,S$GLB,, | Performed by: OBSTETRICS & GYNECOLOGY

## 2020-06-15 PROCEDURE — 82670 ASSAY OF TOTAL ESTRADIOL: CPT

## 2020-06-15 PROCEDURE — 36415 COLL VENOUS BLD VENIPUNCTURE: CPT

## 2020-06-15 PROCEDURE — 76830 PR  ECHOGRAPHY,TRANSVAGINAL: ICD-10-PCS | Mod: S$GLB,,, | Performed by: OBSTETRICS & GYNECOLOGY

## 2020-06-15 PROCEDURE — 76856 PR  ECHO,PELVIC (NONOBSTETRIC): ICD-10-PCS | Mod: S$GLB,,, | Performed by: OBSTETRICS & GYNECOLOGY

## 2020-06-15 NOTE — PROGRESS NOTES
"  OBSTETRIC HISTORY:   OB History        3    Para        Term                AB   1    Living   2       SAB   0    TAB   1    Ectopic        Multiple        Live Births   2                COMPREHENSIVE GYN HISTORY:  PAP History: Denies abnormal Paps.  Infection History: Denies STDs. Denies PID.  Benign History: Denies uterine fibroids. Denies ovarian cysts. Denies endometriosis.   Cancer History: Denies cervical cancer. Denies uterine cancer or hyperplasia. Denies ovarian cancer. Denies vulvar cancer or pre-cancer. Denies vaginal cancer or pre-cancer. Denies breast cancer. Denies colon cancer.  Sexual Activity History:   reports that she currently engages in sexual activity and has had male partners. She reports using the following method of birth control/protection: None.   Menstrual History: Every 21 days, flows for 5-7 days. Normal flow.  Dysmenorrhea History: Reports occ. dysmenorrhea.  Contraception: None       HPI:   39 y.o.  Patient's last menstrual period was 2020.   Patient is  here for her annual gynecologic exam.  She has multiple complaints including bilateral lower quadrant pain one week prior to start of menses for one year and feels period has changed since termination. Has bloating every period and can't lose weight. Feeling hormones are out of whack.    ROS:  GENERAL: Denies weight loss. Feeling not herself.   SKIN: Denies rash or lesions.   HEAD: Denies headache.   NODES: Denies enlarged lymph nodes.   CHEST: Denies shortness of breath.   ABDOMEN: No constipation, diarrhea, nausea, vomiting or rectal bleeding.   URINARY: No frequency, dysuria, hematuria, or burning on urination.  REPRODUCTIVE: See HPI.   BREASTS: The patient denies pain, lumps, or nipple discharge.   HEMATOLOGIC: No easy bruisability.   MUSCULOSKELETAL: Denies joint pain or back pain.   NEUROLOGIC: Denies weakness.   PSYCHIATRIC: Denies depression, anxiety or mood swings.    PE:   /70   Ht 5' 7" (1.702 " m)   Wt 90.2 kg (198 lb 14.4 oz)   LMP 06/07/2020   BMI 31.15 kg/m²   APPEARANCE: Well nourished, well developed, in no acute distress.  NECK: Neck symmetric without  thyromegaly.  NODES: No inguinal, cervical lymph node enlargement.  CHEST: Lungs clear to auscultation.  HEART: Regular rate and rhythm, no murmurs, rubs or gallops.  ABDOMEN: Soft. No tenderness or masses. No hernias.  BREASTS: Symmetrical, no skin changes or visible lesions other than well healed old scars. No palpable masses, nipple discharge or adenopathy bilaterally. Very dense  PELVIC:   VULVA: No lesions. Normal female genitalia.  URETHRAL MEATUS: Normal size and location, no lesions, no prolapse.  URETHRA: No masses, tenderness, prolapse or scarring.  VAGINA: Moist and well rugated, no discharge, no significant cystocele or rectocele.  CERVIX: No lesions and discharge.  UTERUS: Normal size, regular shape, mobile, non-tender, bladder base nontender.  ADNEXA: Right adnexal fullness off of right fundal area and tenderness.    PROCEDURES:  Pap smear  HRHPV  TVUS   FSH, Estradiol and LH    Assessment:  Normal Gynecologic Exam  Right adnexal mass-- TVUS  Hormone disorder-- labs  Weight gain/inability to lose weight--Gaining of belly fat/weight is a natural aging phenomenon not a hormone issue typically. As we age we do not utilize glucose as well and our insulin and glucose do not function as well as they use to. The best diet types are some form of high protein/low carb such as Ideal protein, Atkins, Mediterranean Diet, [place] etc.      Plan:  Mammogram and Colonoscopy if indicated by current recommendations.  Return to clinic in one year or for any problems or complaints.    Counseling:  Patient was counseled today on A.C.S. Pap guidelines and recommendations for yearly pelvic exams and monthly self breast exams; to see her PCP for other health maintenance. Regular exercise and healthy diet.

## 2020-06-17 ENCOUNTER — TELEPHONE (OUTPATIENT)
Dept: OBSTETRICS AND GYNECOLOGY | Facility: CLINIC | Age: 40
End: 2020-06-17

## 2020-06-22 LAB
FINAL PATHOLOGIC DIAGNOSIS: NORMAL
HPV HR 12 DNA SPEC QL NAA+PROBE: NEGATIVE
HPV16 AG SPEC QL: NEGATIVE
HPV18 DNA SPEC QL NAA+PROBE: NEGATIVE
Lab: NORMAL

## 2020-10-05 ENCOUNTER — PATIENT MESSAGE (OUTPATIENT)
Dept: ADMINISTRATIVE | Facility: HOSPITAL | Age: 40
End: 2020-10-05

## 2020-10-27 ENCOUNTER — PATIENT OUTREACH (OUTPATIENT)
Dept: ADMINISTRATIVE | Facility: OTHER | Age: 40
End: 2020-10-27

## 2020-10-27 NOTE — PROGRESS NOTES
Care Everywhere:   Immunization: updated(delay in links)  Health Maintenance: updated  Media Review:   Legacy Review:   Order placed:   Upcoming [place]  Primary Care patient portal message sent on 10/5/2020

## 2020-10-28 ENCOUNTER — PATIENT MESSAGE (OUTPATIENT)
Dept: DERMATOLOGY | Facility: CLINIC | Age: 40
End: 2020-10-28

## 2020-10-28 ENCOUNTER — OFFICE VISIT (OUTPATIENT)
Dept: DERMATOLOGY | Facility: CLINIC | Age: 40
End: 2020-10-28
Payer: COMMERCIAL

## 2020-10-28 VITALS — BODY MASS INDEX: 31.01 KG/M2 | WEIGHT: 198 LBS

## 2020-10-28 DIAGNOSIS — L82.1 SK (SEBORRHEIC KERATOSIS): ICD-10-CM

## 2020-10-28 DIAGNOSIS — L28.2 PRURIGO: ICD-10-CM

## 2020-10-28 DIAGNOSIS — L28.0 LICHEN SIMPLEX: Primary | ICD-10-CM

## 2020-10-28 PROCEDURE — 99213 OFFICE O/P EST LOW 20 MIN: CPT | Mod: 25,S$GLB,, | Performed by: DERMATOLOGY

## 2020-10-28 PROCEDURE — 99999 PR PBB SHADOW E&M-EST. PATIENT-LVL III: ICD-10-PCS | Mod: PBBFAC,,, | Performed by: DERMATOLOGY

## 2020-10-28 PROCEDURE — 99213 PR OFFICE/OUTPT VISIT, EST, LEVL III, 20-29 MIN: ICD-10-PCS | Mod: 25,S$GLB,, | Performed by: DERMATOLOGY

## 2020-10-28 PROCEDURE — 3008F PR BODY MASS INDEX (BMI) DOCUMENTED: ICD-10-PCS | Mod: CPTII,S$GLB,, | Performed by: DERMATOLOGY

## 2020-10-28 PROCEDURE — 11900 PR INJECTION INTO SKIN LESIONS, UP TO 7: ICD-10-PCS | Mod: S$GLB,,, | Performed by: DERMATOLOGY

## 2020-10-28 PROCEDURE — 99999 PR PBB SHADOW E&M-EST. PATIENT-LVL III: CPT | Mod: PBBFAC,,, | Performed by: DERMATOLOGY

## 2020-10-28 PROCEDURE — 3008F BODY MASS INDEX DOCD: CPT | Mod: CPTII,S$GLB,, | Performed by: DERMATOLOGY

## 2020-10-28 PROCEDURE — 11900 INJECT SKIN LESIONS </W 7: CPT | Mod: S$GLB,,, | Performed by: DERMATOLOGY

## 2020-10-28 RX ORDER — TACROLIMUS 0.3 MG/G
OINTMENT TOPICAL 2 TIMES DAILY
Qty: 60 G | Refills: 3 | Status: SHIPPED | OUTPATIENT
Start: 2020-10-28 | End: 2020-10-30

## 2020-10-28 NOTE — PROGRESS NOTES
"  Subjective:       Patient ID:  Meri Cosme is a 39 y.o. female who presents for   Chief Complaint   Patient presents with    Lesion     arms and legs, face     Had some "warts" frozen elsewhere and have not healed.  See previous notes, has hx of prurigo dating back to 2015.  She admits to rubbing the areas.  Also new lesion on forehead.        Review of Systems   Constitutional: Negative for fever, chills, weight loss, weight gain, fatigue, night sweats and malaise.   Skin: Positive for rash. Negative for daily sunscreen use, activity-related sunscreen use and wears hat.   Hematologic/Lymphatic: Does not bruise/bleed easily.        Objective:    Physical Exam   Constitutional: She appears well-developed and well-nourished.   Neurological: She is alert and oriented to person, place, and time.   Psychiatric: She has a normal mood and affect.   Skin:   Areas Examined (abnormalities noted in diagram):   Head / Face Inspection Performed  Neck Inspection Performed  RUE Inspected  LUE Inspection Performed  RLE Inspected  LLE Inspection Performed                       Diagram Legend     Erythematous scaling macule/papule c/w actinic keratosis       Vascular papule c/w angioma      Pigmented verrucoid papule/plaque c/w seborrheic keratosis      Yellow umbilicated papule c/w sebaceous hyperplasia      Irregularly shaped tan macule c/w lentigo     1-2 mm smooth white papules consistent with Milia      Movable subcutaneous cyst with punctum c/w epidermal inclusion cyst      Subcutaneous movable cyst c/w pilar cyst      Firm pink to brown papule c/w dermatofibroma      Pedunculated fleshy papule(s) c/w skin tag(s)      Evenly pigmented macule c/w junctional nevus     Mildly variegated pigmented, slightly irregular-bordered macule c/w mildly atypical nevus      Flesh colored to evenly pigmented papule c/w intradermal nevus       Pink pearly papule/plaque c/w basal cell carcinoma      Erythematous hyperkeratotic cursted " plaque c/w SCC      Surgical scar with no sign of skin cancer recurrence      Open and closed comedones      Inflammatory papules and pustules      Verrucoid papule consistent consistent with wart     Erythematous eczematous patches and plaques     Dystrophic onycholytic nail with subungual debris c/w onychomycosis     Umbilicated papule    Erythematous-base heme-crusted tan verrucoid plaque consistent with inflamed seborrheic keratosis     Erythematous Silvery Scaling Plaque c/w Psoriasis     See annotation      Assessment / Plan:      Prurigo/  Lichen simplex  -     tacrolimus (PROTOPIC) 0.03 % ointment; Apply topically 2 (two) times daily.  Dispense: 60 g; Refill: 3  Dc rubbing areas  Intralesional Kenalog 5mg/cc (0.2 cc total) injected into 2 lesions on the chin and left lower leg today after obtaining verbal consent including risk of surrounding hypopigmentation. Patient tolerated procedure well.    Units: 1  NDC for Kenalog 10mg/cc:  5938-8635-06                  SK (seborrheic keratosis)  reassurance  Brochure provided           Follow up if symptoms worsen or fail to improve.

## 2020-10-30 DIAGNOSIS — L28.2 PRURIGO: Primary | ICD-10-CM

## 2020-10-30 RX ORDER — TACROLIMUS 1 MG/G
OINTMENT TOPICAL 2 TIMES DAILY
Qty: 60 G | Refills: 3 | Status: SHIPPED | OUTPATIENT
Start: 2020-10-30 | End: 2021-08-17 | Stop reason: SDUPTHER

## 2020-11-02 ENCOUNTER — PATIENT MESSAGE (OUTPATIENT)
Dept: DERMATOLOGY | Facility: CLINIC | Age: 40
End: 2020-11-02

## 2020-11-19 ENCOUNTER — OFFICE VISIT (OUTPATIENT)
Dept: DERMATOLOGY | Facility: CLINIC | Age: 40
End: 2020-11-19
Payer: COMMERCIAL

## 2020-11-19 VITALS — WEIGHT: 198 LBS | BODY MASS INDEX: 31.01 KG/M2

## 2020-11-19 DIAGNOSIS — L28.2 PRURIGO: ICD-10-CM

## 2020-11-19 DIAGNOSIS — L28.0 LICHEN SIMPLEX: Primary | ICD-10-CM

## 2020-11-19 PROCEDURE — 3008F BODY MASS INDEX DOCD: CPT | Mod: CPTII,S$GLB,, | Performed by: DERMATOLOGY

## 2020-11-19 PROCEDURE — 99999 PR PBB SHADOW E&M-EST. PATIENT-LVL II: ICD-10-PCS | Mod: PBBFAC,,, | Performed by: DERMATOLOGY

## 2020-11-19 PROCEDURE — 11900 PR INJECTION INTO SKIN LESIONS, UP TO 7: ICD-10-PCS | Mod: S$GLB,,, | Performed by: DERMATOLOGY

## 2020-11-19 PROCEDURE — 99213 PR OFFICE/OUTPT VISIT, EST, LEVL III, 20-29 MIN: ICD-10-PCS | Mod: 25,S$GLB,, | Performed by: DERMATOLOGY

## 2020-11-19 PROCEDURE — 11900 INJECT SKIN LESIONS </W 7: CPT | Mod: S$GLB,,, | Performed by: DERMATOLOGY

## 2020-11-19 PROCEDURE — 99213 OFFICE O/P EST LOW 20 MIN: CPT | Mod: 25,S$GLB,, | Performed by: DERMATOLOGY

## 2020-11-19 PROCEDURE — 1126F PR PAIN SEVERITY QUANTIFIED, NO PAIN PRESENT: ICD-10-PCS | Mod: S$GLB,,, | Performed by: DERMATOLOGY

## 2020-11-19 PROCEDURE — 3008F PR BODY MASS INDEX (BMI) DOCUMENTED: ICD-10-PCS | Mod: CPTII,S$GLB,, | Performed by: DERMATOLOGY

## 2020-11-19 PROCEDURE — 1126F AMNT PAIN NOTED NONE PRSNT: CPT | Mod: S$GLB,,, | Performed by: DERMATOLOGY

## 2020-11-19 PROCEDURE — 99999 PR PBB SHADOW E&M-EST. PATIENT-LVL II: CPT | Mod: PBBFAC,,, | Performed by: DERMATOLOGY

## 2020-11-19 RX ORDER — INFLUENZA A VIRUS A/NEBRASKA/14/2019 (H1N1) ANTIGEN (MDCK CELL DERIVED, PROPIOLACTONE INACTIVATED), INFLUENZA A VIRUS A/DELAWARE/39/2019 (H3N2) ANTIGEN (MDCK CELL DERIVED, PROPIOLACTONE INACTIVATED), INFLUENZA B VIRUS B/SINGAPORE/INFTT-16-0610/2016 ANTIGEN (MDCK CELL DERIVED, PROPIOLACTONE INACTIVATED), INFLUENZA B VIRUS B/DARWIN/7/2019 ANTIGEN (MDCK CELL DERIVED, PROPIOLACTONE INACTIVATED) 15; 15; 15; 15 UG/.5ML; UG/.5ML; UG/.5ML; UG/.5ML
INJECTION, SUSPENSION INTRAMUSCULAR
COMMUNITY
Start: 2020-09-02 | End: 2021-10-12

## 2020-11-19 NOTE — PROGRESS NOTES
Subjective:       Patient ID:  Meri Cosme is a 40 y.o. female who presents for   Chief Complaint   Patient presents with    Follow-up     The lesions on her legs have much improved, still with 3 lesions on left arm and some under chin would like tx.       Review of Systems   Constitutional: Negative for fever, chills, weight loss, weight gain, fatigue, night sweats and malaise.   Skin: Positive for itching and rash. Negative for daily sunscreen use and activity-related sunscreen use.   Hematologic/Lymphatic: Does not bruise/bleed easily.        Objective:    Physical Exam   Constitutional: She appears well-developed and well-nourished.   Neurological: She is alert and oriented to person, place, and time.   Psychiatric: She has a normal mood and affect.   Skin:   Areas Examined (abnormalities noted in diagram):   Head / Face Inspection Performed  Neck Inspection Performed  RUE Inspected  LUE Inspection Performed  RLE Inspected  LLE Inspection Performed                       Diagram Legend     Erythematous scaling macule/papule c/w actinic keratosis       Vascular papule c/w angioma      Pigmented verrucoid papule/plaque c/w seborrheic keratosis      Yellow umbilicated papule c/w sebaceous hyperplasia      Irregularly shaped tan macule c/w lentigo     1-2 mm smooth white papules consistent with Milia      Movable subcutaneous cyst with punctum c/w epidermal inclusion cyst      Subcutaneous movable cyst c/w pilar cyst      Firm pink to brown papule c/w dermatofibroma      Pedunculated fleshy papule(s) c/w skin tag(s)      Evenly pigmented macule c/w junctional nevus     Mildly variegated pigmented, slightly irregular-bordered macule c/w mildly atypical nevus      Flesh colored to evenly pigmented papule c/w intradermal nevus       Pink pearly papule/plaque c/w basal cell carcinoma      Erythematous hyperkeratotic cursted plaque c/w SCC      Surgical scar with no sign of skin cancer recurrence      Open and closed  comedones      Inflammatory papules and pustules      Verrucoid papule consistent consistent with wart     Erythematous eczematous patches and plaques     Dystrophic onycholytic nail with subungual debris c/w onychomycosis     Umbilicated papule    Erythematous-base heme-crusted tan verrucoid plaque consistent with inflamed seborrheic keratosis     Erythematous Silvery Scaling Plaque c/w Psoriasis     See annotation      Assessment / Plan:      Prurigo/  Lichen simplex  Intralesional Kenalog 5mg/cc (0.8 cc total) injected into 8 lesions  today after obtaining verbal consent including risk of surrounding hypopigmentation. Patient tolerated procedure well.    Units: 1  NDC for Kenalog 10mg/cc:  7995-6263-73    Cont protopic ug  Remember not to rub or pick at lesions                   Follow up in about 4 weeks (around 12/17/2020).

## 2020-12-08 ENCOUNTER — TELEPHONE (OUTPATIENT)
Dept: FAMILY MEDICINE | Facility: CLINIC | Age: 40
End: 2020-12-08

## 2020-12-08 DIAGNOSIS — Z00.00 ROUTINE GENERAL MEDICAL EXAMINATION AT HEALTH CARE FACILITY: Primary | ICD-10-CM

## 2020-12-08 DIAGNOSIS — E55.9 VITAMIN D DEFICIENCY: ICD-10-CM

## 2020-12-08 DIAGNOSIS — D50.9 IRON DEFICIENCY ANEMIA, UNSPECIFIED IRON DEFICIENCY ANEMIA TYPE: ICD-10-CM

## 2020-12-08 NOTE — TELEPHONE ENCOUNTER
Patient is schedule for annual visit with Dr. Alston.  Requesting lab orders to be completed prior to appointment.  Please advise.      Added patient to wait-list per request.

## 2020-12-10 ENCOUNTER — OFFICE VISIT (OUTPATIENT)
Dept: URGENT CARE | Facility: CLINIC | Age: 40
End: 2020-12-10
Payer: COMMERCIAL

## 2020-12-10 VITALS
WEIGHT: 198 LBS | OXYGEN SATURATION: 98 % | SYSTOLIC BLOOD PRESSURE: 121 MMHG | TEMPERATURE: 99 F | DIASTOLIC BLOOD PRESSURE: 76 MMHG | HEIGHT: 67 IN | RESPIRATION RATE: 16 BRPM | HEART RATE: 67 BPM | BODY MASS INDEX: 31.08 KG/M2

## 2020-12-10 DIAGNOSIS — M25.561 PAIN IN JOINT OF RIGHT KNEE: ICD-10-CM

## 2020-12-10 DIAGNOSIS — S80.01XA CONTUSION OF RIGHT KNEE, INITIAL ENCOUNTER: ICD-10-CM

## 2020-12-10 DIAGNOSIS — Z03.818 ENCNTR FOR OBS FOR SUSP EXPSR TO OTH BIOLG AGENTS RULED OUT: ICD-10-CM

## 2020-12-10 DIAGNOSIS — R05.9 COUGH: Primary | ICD-10-CM

## 2020-12-10 LAB
CTP QC/QA: YES
SARS-COV-2 RDRP RESP QL NAA+PROBE: NEGATIVE

## 2020-12-10 PROCEDURE — 99213 PR OFFICE/OUTPT VISIT, EST, LEVL III, 20-29 MIN: ICD-10-PCS | Mod: S$GLB,,, | Performed by: FAMILY MEDICINE

## 2020-12-10 PROCEDURE — 1125F PR PAIN SEVERITY QUANTIFIED, PAIN PRESENT: ICD-10-PCS | Mod: S$GLB,,, | Performed by: FAMILY MEDICINE

## 2020-12-10 PROCEDURE — 1125F AMNT PAIN NOTED PAIN PRSNT: CPT | Mod: S$GLB,,, | Performed by: FAMILY MEDICINE

## 2020-12-10 PROCEDURE — U0002: ICD-10-PCS | Mod: QW,S$GLB,, | Performed by: FAMILY MEDICINE

## 2020-12-10 PROCEDURE — 99213 OFFICE O/P EST LOW 20 MIN: CPT | Mod: S$GLB,,, | Performed by: FAMILY MEDICINE

## 2020-12-10 PROCEDURE — U0002 COVID-19 LAB TEST NON-CDC: HCPCS | Mod: QW,S$GLB,, | Performed by: FAMILY MEDICINE

## 2020-12-10 PROCEDURE — 3008F BODY MASS INDEX DOCD: CPT | Mod: CPTII,S$GLB,, | Performed by: FAMILY MEDICINE

## 2020-12-10 PROCEDURE — 3008F PR BODY MASS INDEX (BMI) DOCUMENTED: ICD-10-PCS | Mod: CPTII,S$GLB,, | Performed by: FAMILY MEDICINE

## 2020-12-10 RX ORDER — LORATADINE 10 MG/1
10 TABLET ORAL DAILY
Qty: 30 TABLET | Refills: 2 | Status: SHIPPED | OUTPATIENT
Start: 2020-12-10 | End: 2021-10-12

## 2020-12-11 NOTE — PROGRESS NOTES
"Subjective:       Patient ID: Meri Cosme is a 40 y.o. female.    Vitals:  height is 5' 7" (1.702 m) and weight is 89.8 kg (198 lb). Her oral temperature is 98.5 °F (36.9 °C). Her blood pressure is 121/76 and her pulse is 67. Her respiration is 16 and oxygen saturation is 98%.     Chief Complaint: URI and Knee Pain (LEFT KNEE )    Patient states that she slipped on a wet floor while in [facility] today. Patient says the pain does not radiate. Patient has been directly exposed to a positive patient.  Denies any fever chills denies any cough congestion no history of asthma  Pain right knees very mild happened [place]    URI   This is a new problem. The current episode started today. The problem has been unchanged. There has been no fever. Associated symptoms include coughing. Pertinent negatives include no abdominal pain, congestion, ear pain, nausea, rash, sinus pain, sore throat, vomiting or wheezing. She has tried nothing for the symptoms. The treatment provided no relief.   Knee Pain   The incident occurred 12 to 24 hours ago. Incident location: IN THE STORE  The injury mechanism was a fall. The pain is present in the left knee. The pain is at a severity of 8/10. The pain is moderate. The pain has been fluctuating since onset. She reports no foreign bodies present. The symptoms are aggravated by palpation, movement and weight bearing. She has tried nothing for the symptoms. The treatment provided no relief.       Constitution: Negative for chills, sweating, fatigue and fever.   HENT: Positive for postnasal drip. Negative for ear pain, facial swelling, facial trauma, congestion, sinus pain, sinus pressure, sore throat and voice change.    Neck: Negative for neck stiffness and painful lymph nodes.   Cardiovascular: Negative for chest trauma.   Eyes: Negative for eye trauma, eye redness, double vision and blurred vision.   Respiratory: Positive for cough. Negative for chest tightness, " sputum production, bloody sputum, COPD, shortness of breath, stridor, wheezing and asthma.    Gastrointestinal: Negative for abdominal trauma, abdominal pain, nausea, vomiting and rectal bleeding.   Genitourinary: Negative for hematuria, missed menses, genital trauma and pelvic pain.   Musculoskeletal: Positive for joint pain and joint swelling. Negative for pain, trauma, abnormal ROM of joint and muscle ache.   Skin: Negative for color change, rash, wound, abrasion, laceration and bruising.   Allergic/Immunologic: Negative for seasonal allergies and asthma.   Neurological: Negative for dizziness, history of vertigo, light-headedness, coordination disturbances, altered mental status and loss of consciousness.   Hematologic/Lymphatic: Negative for swollen lymph nodes and history of bleeding disorder.   Psychiatric/Behavioral: Negative for altered mental status.       Objective:      Physical Exam   Constitutional: She is oriented to person, place, and time. She appears well-developed. She is cooperative.  Non-toxic appearance. She does not appear ill. No distress.   HENT:   Head: Normocephalic and atraumatic.   Ears:   Right Ear: Hearing, tympanic membrane, external ear and ear canal normal.   Left Ear: Hearing, tympanic membrane, external ear and ear canal normal.   Nose: Nose normal. No mucosal edema, rhinorrhea or nasal deformity. No epistaxis. Right sinus exhibits no maxillary sinus tenderness and no frontal sinus tenderness. Left sinus exhibits no maxillary sinus tenderness and no frontal sinus tenderness.   Mouth/Throat: Uvula is midline, oropharynx is clear and moist and mucous membranes are normal. No trismus in the jaw. Normal dentition. No uvula swelling. No posterior oropharyngeal erythema.   Eyes: Conjunctivae and lids are normal. Right eye exhibits no discharge. Left eye exhibits no discharge. No scleral icterus.   Neck: Trachea normal, normal range of motion, full passive range of motion without pain  and phonation normal. Neck supple.   Cardiovascular: Normal rate, regular rhythm, normal heart sounds and normal pulses.   Pulmonary/Chest: Effort normal and breath sounds normal. No respiratory distress.   Abdominal: Soft. Normal appearance and bowel sounds are normal. She exhibits no distension, no pulsatile midline mass and no mass. There is no abdominal tenderness.   Musculoskeletal: Normal range of motion.         General: No deformity.      Comments: Left knee with the miles superficial bruising minimal tenderness otherwise full range of movement able to weight on   Neurological: She is alert and oriented to person, place, and time. She exhibits normal muscle tone. Coordination normal.   Skin: Skin is warm, dry, intact, not diaphoretic and not pale. Psychiatric: Her speech is normal and behavior is normal. Judgment and thought content normal.   Nursing note and vitals reviewed.        Assessment:       1. Cough    2. Encntr for obs for susp expsr to oth biolg agents ruled out    3. Pain in joint of right knee    4. Contusion of right knee, initial encounter        Plan:         Cough  -     POCT COVID-19 Rapid Screening    Encntr for obs for susp expsr to oth biolg agents ruled out    Pain in joint of right knee    Contusion of right knee, initial encounter    Other orders  -     loratadine (CLARITIN) 10 mg tablet; Take 1 tablet (10 mg total) by mouth once daily.  Dispense: 30 tablet; Refill: 2        patient advised applying ice to the left knee take Claritin once a day for sore throat reassurance    Results for orders placed or performed in visit on 12/10/20   POCT COVID-19 Rapid Screening   Result Value Ref Range    POC Rapid COVID Negative Negative     Acceptable Yes

## 2020-12-14 ENCOUNTER — TELEPHONE (OUTPATIENT)
Dept: OBSTETRICS AND GYNECOLOGY | Facility: CLINIC | Age: 40
End: 2020-12-14

## 2020-12-14 DIAGNOSIS — Z12.31 ENCOUNTER FOR SCREENING MAMMOGRAM FOR BREAST CANCER: Primary | ICD-10-CM

## 2020-12-14 NOTE — TELEPHONE ENCOUNTER
----- Message from Danielle Urrutia sent at 12/14/2020  8:09 AM CST -----  Contact: [phone]  Type:  Mammogram    Caller is requesting to schedule their annual mammogram appointment.  Order is not listed in EPIC.  Please enter order and contact patient to schedule.  Name of Caller: PT  Where would they like the mammogram performed? [facility]  Would the patient rather a call back or a response via [facility]?  Call Back  Best Call Back Number: [phone]

## 2020-12-15 ENCOUNTER — PATIENT OUTREACH (OUTPATIENT)
Dept: ADMINISTRATIVE | Facility: OTHER | Age: 40
End: 2020-12-15

## 2020-12-16 NOTE — PROGRESS NOTES
Care Everywhere:   Immunization: updated  Health Maintenance: updated  Media Review:   Legacy Review:   Order placed:   Upcoming appts:mammogram 12.29

## 2020-12-17 ENCOUNTER — OFFICE VISIT (OUTPATIENT)
Dept: DERMATOLOGY | Facility: CLINIC | Age: 40
End: 2020-12-17
Payer: COMMERCIAL

## 2020-12-17 VITALS — BODY MASS INDEX: 31.01 KG/M2 | WEIGHT: 198 LBS

## 2020-12-17 DIAGNOSIS — D22.9 NEVUS OF MULTIPLE SITES: ICD-10-CM

## 2020-12-17 DIAGNOSIS — L28.2 PRURIGO: Primary | ICD-10-CM

## 2020-12-17 PROCEDURE — 99213 OFFICE O/P EST LOW 20 MIN: CPT | Mod: 25,S$GLB,, | Performed by: DERMATOLOGY

## 2020-12-17 PROCEDURE — 11900 PR INJECTION INTO SKIN LESIONS, UP TO 7: ICD-10-PCS | Mod: S$GLB,,, | Performed by: DERMATOLOGY

## 2020-12-17 PROCEDURE — 11900 INJECT SKIN LESIONS </W 7: CPT | Mod: S$GLB,,, | Performed by: DERMATOLOGY

## 2020-12-17 PROCEDURE — 1126F PR PAIN SEVERITY QUANTIFIED, NO PAIN PRESENT: ICD-10-PCS | Mod: S$GLB,,, | Performed by: DERMATOLOGY

## 2020-12-17 PROCEDURE — 3008F PR BODY MASS INDEX (BMI) DOCUMENTED: ICD-10-PCS | Mod: CPTII,S$GLB,, | Performed by: DERMATOLOGY

## 2020-12-17 PROCEDURE — 99999 PR PBB SHADOW E&M-EST. PATIENT-LVL II: ICD-10-PCS | Mod: PBBFAC,,, | Performed by: DERMATOLOGY

## 2020-12-17 PROCEDURE — 3008F BODY MASS INDEX DOCD: CPT | Mod: CPTII,S$GLB,, | Performed by: DERMATOLOGY

## 2020-12-17 PROCEDURE — 1126F AMNT PAIN NOTED NONE PRSNT: CPT | Mod: S$GLB,,, | Performed by: DERMATOLOGY

## 2020-12-17 PROCEDURE — 99999 PR PBB SHADOW E&M-EST. PATIENT-LVL II: CPT | Mod: PBBFAC,,, | Performed by: DERMATOLOGY

## 2020-12-17 PROCEDURE — 99213 PR OFFICE/OUTPT VISIT, EST, LEVL III, 20-29 MIN: ICD-10-PCS | Mod: 25,S$GLB,, | Performed by: DERMATOLOGY

## 2020-12-17 NOTE — PROGRESS NOTES
Subjective:       Patient ID:  Meri Cosme is a 40 y.o. female who presents for   Chief Complaint   Patient presents with    Follow-up     Kenalpg injection     HPI    Review of Systems   Constitutional: Negative for fever, chills, weight loss, weight gain, fatigue, night sweats and malaise.   Skin: Negative for daily sunscreen use, activity-related sunscreen use and wears hat.   Hematologic/Lymphatic: Does not bruise/bleed easily.        Objective:    Physical Exam   Constitutional: She appears well-developed and well-nourished.   Neurological: She is alert and oriented to person, place, and time.   Psychiatric: She has a normal mood and affect.   Skin:   Areas Examined (abnormalities noted in diagram):   Head / Face Inspection Performed  Neck Inspection Performed  RUE Inspected  LUE Inspection Performed  RLE Inspected  LLE Inspection Performed                       Diagram Legend     Erythematous scaling macule/papule c/w actinic keratosis       Vascular papule c/w angioma      Pigmented verrucoid papule/plaque c/w seborrheic keratosis      Yellow umbilicated papule c/w sebaceous hyperplasia      Irregularly shaped tan macule c/w lentigo     1-2 mm smooth white papules consistent with Milia      Movable subcutaneous cyst with punctum c/w epidermal inclusion cyst      Subcutaneous movable cyst c/w pilar cyst      Firm pink to brown papule c/w dermatofibroma      Pedunculated fleshy papule(s) c/w skin tag(s)      Evenly pigmented macule c/w junctional nevus     Mildly variegated pigmented, slightly irregular-bordered macule c/w mildly atypical nevus      Flesh colored to evenly pigmented papule c/w intradermal nevus       Pink pearly papule/plaque c/w basal cell carcinoma      Erythematous hyperkeratotic cursted plaque c/w SCC      Surgical scar with no sign of skin cancer recurrence      Open and closed comedones      Inflammatory papules and pustules      Verrucoid papule consistent consistent with wart      Erythematous eczematous patches and plaques     Dystrophic onycholytic nail with subungual debris c/w onychomycosis     Umbilicated papule    Erythematous-base heme-crusted tan verrucoid plaque consistent with inflamed seborrheic keratosis     Erythematous Silvery Scaling Plaque c/w Psoriasis     See annotation      Assessment / Plan:        Prurigo  Intralesional Kenalog 5mg/cc (0.2 cc total) injected into 2 lesions on the left arm today after obtaining verbal consent including risk of surrounding hypopigmentation. Patient tolerated procedure well.    Units: 1  NDC for Kenalog 10mg/cc:  2900-4638-67      Nevus of multiple sites               Follow up if symptoms worsen or fail to improve.

## 2020-12-29 ENCOUNTER — HOSPITAL ENCOUNTER (OUTPATIENT)
Dept: RADIOLOGY | Facility: HOSPITAL | Age: 40
Discharge: HOME | End: 2020-12-29
Attending: OBSTETRICS & GYNECOLOGY
Payer: COMMERCIAL

## 2020-12-29 DIAGNOSIS — Z12.31 ENCOUNTER FOR SCREENING MAMMOGRAM FOR BREAST CANCER: ICD-10-CM

## 2020-12-29 PROCEDURE — 77067 SCR MAMMO BI INCL CAD: CPT | Mod: 26,,, | Performed by: RADIOLOGY

## 2020-12-29 PROCEDURE — 77063 BREAST TOMOSYNTHESIS BI: CPT | Mod: 26,,, | Performed by: RADIOLOGY

## 2020-12-29 PROCEDURE — 77067 SCR MAMMO BI INCL CAD: CPT | Mod: TC

## 2020-12-29 PROCEDURE — 77067 MAMMO DIGITAL SCREENING BILAT WITH TOMO: ICD-10-PCS | Mod: 26,,, | Performed by: RADIOLOGY

## 2020-12-29 PROCEDURE — 77063 MAMMO DIGITAL SCREENING BILAT WITH TOMO: ICD-10-PCS | Mod: 26,,, | Performed by: RADIOLOGY

## 2020-12-30 ENCOUNTER — LAB VISIT (OUTPATIENT)
Dept: LAB | Facility: HOSPITAL | Age: 40
End: 2020-12-30
Attending: FAMILY MEDICINE
Payer: COMMERCIAL

## 2020-12-30 DIAGNOSIS — E55.9 VITAMIN D DEFICIENCY: ICD-10-CM

## 2020-12-30 DIAGNOSIS — Z00.00 ROUTINE GENERAL MEDICAL EXAMINATION AT HEALTH CARE FACILITY: ICD-10-CM

## 2020-12-30 DIAGNOSIS — D50.9 IRON DEFICIENCY ANEMIA, UNSPECIFIED IRON DEFICIENCY ANEMIA TYPE: ICD-10-CM

## 2020-12-30 LAB
25(OH)D3+25(OH)D2 SERPL-MCNC: 12 NG/ML (ref 30–96)
ALBUMIN SERPL BCP-MCNC: 3.9 G/DL (ref 3.5–5.2)
ALP SERPL-CCNC: 58 U/L (ref 55–135)
ALT SERPL W/O P-5'-P-CCNC: 11 U/L (ref 10–44)
ANION GAP SERPL CALC-SCNC: 9 MMOL/L (ref 8–16)
AST SERPL-CCNC: 16 U/L (ref 10–40)
BASOPHILS # BLD AUTO: 0.01 K/UL (ref 0–0.2)
BASOPHILS NFR BLD: 0.3 % (ref 0–1.9)
BILIRUB SERPL-MCNC: 0.6 MG/DL (ref 0.1–1)
BUN SERPL-MCNC: 12 MG/DL (ref 6–20)
CALCIUM SERPL-MCNC: 8.7 MG/DL (ref 8.7–10.5)
CHLORIDE SERPL-SCNC: 106 MMOL/L (ref 95–110)
CHOLEST SERPL-MCNC: 195 MG/DL (ref 120–199)
CHOLEST/HDLC SERPL: 4 {RATIO} (ref 2–5)
CO2 SERPL-SCNC: 24 MMOL/L (ref 23–29)
CREAT SERPL-MCNC: 0.7 MG/DL (ref 0.5–1.4)
DIFFERENTIAL METHOD: ABNORMAL
EOSINOPHIL # BLD AUTO: 0.1 K/UL (ref 0–0.5)
EOSINOPHIL NFR BLD: 3.9 % (ref 0–8)
ERYTHROCYTE [DISTWIDTH] IN BLOOD BY AUTOMATED COUNT: 14.5 % (ref 11.5–14.5)
EST. GFR  (AFRICAN AMERICAN): >60 ML/MIN/1.73 M^2
EST. GFR  (NON AFRICAN AMERICAN): >60 ML/MIN/1.73 M^2
GLUCOSE SERPL-MCNC: 84 MG/DL (ref 70–110)
HCT VFR BLD AUTO: 38.1 % (ref 37–48.5)
HDLC SERPL-MCNC: 49 MG/DL (ref 40–75)
HDLC SERPL: 25.1 % (ref 20–50)
HGB BLD-MCNC: 11.9 G/DL (ref 12–16)
IMM GRANULOCYTES # BLD AUTO: 0.01 K/UL (ref 0–0.04)
IMM GRANULOCYTES NFR BLD AUTO: 0.3 % (ref 0–0.5)
LDLC SERPL CALC-MCNC: 133 MG/DL (ref 63–159)
LYMPHOCYTES # BLD AUTO: 1.1 K/UL (ref 1–4.8)
LYMPHOCYTES NFR BLD: 31.6 % (ref 18–48)
MCH RBC QN AUTO: 27.7 PG (ref 27–31)
MCHC RBC AUTO-ENTMCNC: 31.2 G/DL (ref 32–36)
MCV RBC AUTO: 89 FL (ref 82–98)
MONOCYTES # BLD AUTO: 0.4 K/UL (ref 0.3–1)
MONOCYTES NFR BLD: 12.3 % (ref 4–15)
NEUTROPHILS # BLD AUTO: 1.9 K/UL (ref 1.8–7.7)
NEUTROPHILS NFR BLD: 51.6 % (ref 38–73)
NONHDLC SERPL-MCNC: 146 MG/DL
NRBC BLD-RTO: 0 /100 WBC
PLATELET # BLD AUTO: 232 K/UL (ref 150–350)
PMV BLD AUTO: 11.8 FL (ref 9.2–12.9)
POTASSIUM SERPL-SCNC: 4 MMOL/L (ref 3.5–5.1)
PROT SERPL-MCNC: 6.6 G/DL (ref 6–8.4)
RBC # BLD AUTO: 4.29 M/UL (ref 4–5.4)
SODIUM SERPL-SCNC: 139 MMOL/L (ref 136–145)
TRIGL SERPL-MCNC: 65 MG/DL (ref 30–150)
WBC # BLD AUTO: 3.58 K/UL (ref 3.9–12.7)

## 2020-12-30 PROCEDURE — 80053 COMPREHEN METABOLIC PANEL: CPT

## 2020-12-30 PROCEDURE — 80061 LIPID PANEL: CPT

## 2020-12-30 PROCEDURE — 85025 COMPLETE CBC W/AUTO DIFF WBC: CPT

## 2020-12-30 PROCEDURE — 82306 VITAMIN D 25 HYDROXY: CPT

## 2020-12-30 PROCEDURE — 36415 COLL VENOUS BLD VENIPUNCTURE: CPT

## 2021-01-04 ENCOUNTER — PATIENT MESSAGE (OUTPATIENT)
Dept: ADMINISTRATIVE | Facility: HOSPITAL | Age: 41
End: 2021-01-04

## 2021-01-06 ENCOUNTER — OFFICE VISIT (OUTPATIENT)
Dept: FAMILY MEDICINE | Facility: CLINIC | Age: 41
End: 2021-01-06
Attending: FAMILY MEDICINE
Payer: COMMERCIAL

## 2021-01-06 VITALS
TEMPERATURE: 99 F | WEIGHT: 195.13 LBS | DIASTOLIC BLOOD PRESSURE: 70 MMHG | BODY MASS INDEX: 30.63 KG/M2 | SYSTOLIC BLOOD PRESSURE: 110 MMHG | HEIGHT: 67 IN | HEART RATE: 78 BPM

## 2021-01-06 DIAGNOSIS — E55.9 VITAMIN D DEFICIENCY: ICD-10-CM

## 2021-01-06 DIAGNOSIS — D50.9 IRON DEFICIENCY ANEMIA, UNSPECIFIED IRON DEFICIENCY ANEMIA TYPE: ICD-10-CM

## 2021-01-06 DIAGNOSIS — Z00.00 ROUTINE GENERAL MEDICAL EXAMINATION AT HEALTH CARE FACILITY: Primary | ICD-10-CM

## 2021-01-06 PROCEDURE — 99396 PREV VISIT EST AGE 40-64: CPT | Mod: S$GLB,,, | Performed by: FAMILY MEDICINE

## 2021-01-06 PROCEDURE — 99999 PR PBB SHADOW E&M-EST. PATIENT-LVL III: ICD-10-PCS | Mod: PBBFAC,,, | Performed by: FAMILY MEDICINE

## 2021-01-06 PROCEDURE — 3008F PR BODY MASS INDEX (BMI) DOCUMENTED: ICD-10-PCS | Mod: CPTII,S$GLB,, | Performed by: FAMILY MEDICINE

## 2021-01-06 PROCEDURE — 99999 PR PBB SHADOW E&M-EST. PATIENT-LVL III: CPT | Mod: PBBFAC,,, | Performed by: FAMILY MEDICINE

## 2021-01-06 PROCEDURE — 3008F BODY MASS INDEX DOCD: CPT | Mod: CPTII,S$GLB,, | Performed by: FAMILY MEDICINE

## 2021-01-06 PROCEDURE — 99396 PR PREVENTIVE VISIT,EST,40-64: ICD-10-PCS | Mod: S$GLB,,, | Performed by: FAMILY MEDICINE

## 2021-01-06 RX ORDER — ERGOCALCIFEROL 1.25 MG/1
50000 CAPSULE ORAL
Qty: 12 CAPSULE | Refills: 3 | Status: SHIPPED | OUTPATIENT
Start: 2021-01-06

## 2021-01-07 ENCOUNTER — HOSPITAL ENCOUNTER (OUTPATIENT)
Dept: RADIOLOGY | Facility: HOSPITAL | Age: 41
Discharge: HOME | End: 2021-01-07
Attending: FAMILY MEDICINE
Payer: COMMERCIAL

## 2021-01-07 ENCOUNTER — PATIENT MESSAGE (OUTPATIENT)
Dept: FAMILY MEDICINE | Facility: CLINIC | Age: 41
End: 2021-01-07

## 2021-01-07 ENCOUNTER — TELEPHONE (OUTPATIENT)
Dept: FAMILY MEDICINE | Facility: CLINIC | Age: 41
End: 2021-01-07

## 2021-01-07 DIAGNOSIS — E55.9 VITAMIN D DEFICIENCY: ICD-10-CM

## 2021-01-07 PROCEDURE — 77080 DEXA BONE DENSITY SPINE HIP: ICD-10-PCS | Mod: 26,,, | Performed by: RADIOLOGY

## 2021-01-07 PROCEDURE — 77080 DXA BONE DENSITY AXIAL: CPT | Mod: 26,,, | Performed by: RADIOLOGY

## 2021-01-07 PROCEDURE — 77080 DXA BONE DENSITY AXIAL: CPT | Mod: TC

## 2021-01-17 ENCOUNTER — PATIENT MESSAGE (OUTPATIENT)
Dept: FAMILY MEDICINE | Facility: CLINIC | Age: 41
End: 2021-01-17

## 2021-01-17 DIAGNOSIS — Z83.3 FAMILY HISTORY OF DIABETES MELLITUS (DM): Primary | ICD-10-CM

## 2021-01-19 ENCOUNTER — PATIENT MESSAGE (OUTPATIENT)
Dept: FAMILY MEDICINE | Facility: CLINIC | Age: 41
End: 2021-01-19

## 2021-01-20 ENCOUNTER — TELEPHONE (OUTPATIENT)
Dept: FAMILY MEDICINE | Facility: CLINIC | Age: 41
End: 2021-01-20

## 2021-01-21 ENCOUNTER — LAB VISIT (OUTPATIENT)
Dept: LAB | Facility: HOSPITAL | Age: 41
End: 2021-01-21
Attending: FAMILY MEDICINE
Payer: COMMERCIAL

## 2021-01-21 DIAGNOSIS — Z83.3 FAMILY HISTORY OF DIABETES MELLITUS (DM): ICD-10-CM

## 2021-01-21 LAB
CORTIS SERPL-MCNC: 10.2 UG/DL (ref 4.3–22.4)
ESTIMATED AVG GLUCOSE: 77 MG/DL (ref 68–131)
HBA1C MFR BLD HPLC: 4.3 % (ref 4–5.6)

## 2021-01-21 PROCEDURE — 83036 HEMOGLOBIN GLYCOSYLATED A1C: CPT

## 2021-01-21 PROCEDURE — 82533 TOTAL CORTISOL: CPT

## 2021-01-21 PROCEDURE — 36415 COLL VENOUS BLD VENIPUNCTURE: CPT

## 2021-02-01 ENCOUNTER — OFFICE VISIT (OUTPATIENT)
Dept: DERMATOLOGY | Facility: CLINIC | Age: 41
End: 2021-02-01
Payer: COMMERCIAL

## 2021-02-01 VITALS — BODY MASS INDEX: 30.54 KG/M2 | WEIGHT: 195 LBS

## 2021-02-01 DIAGNOSIS — L28.2 PRURIGO: Primary | ICD-10-CM

## 2021-02-01 PROCEDURE — 11900 INJECT SKIN LESIONS </W 7: CPT | Mod: S$GLB,,, | Performed by: DERMATOLOGY

## 2021-02-01 PROCEDURE — 99212 OFFICE O/P EST SF 10 MIN: CPT | Mod: 25,S$GLB,, | Performed by: DERMATOLOGY

## 2021-02-01 PROCEDURE — 3008F BODY MASS INDEX DOCD: CPT | Mod: CPTII,S$GLB,, | Performed by: DERMATOLOGY

## 2021-02-01 PROCEDURE — 3008F PR BODY MASS INDEX (BMI) DOCUMENTED: ICD-10-PCS | Mod: CPTII,S$GLB,, | Performed by: DERMATOLOGY

## 2021-02-01 PROCEDURE — 1126F PR PAIN SEVERITY QUANTIFIED, NO PAIN PRESENT: ICD-10-PCS | Mod: S$GLB,,, | Performed by: DERMATOLOGY

## 2021-02-01 PROCEDURE — 1126F AMNT PAIN NOTED NONE PRSNT: CPT | Mod: S$GLB,,, | Performed by: DERMATOLOGY

## 2021-02-01 PROCEDURE — 11900 PR INJECTION INTO SKIN LESIONS, UP TO 7: ICD-10-PCS | Mod: S$GLB,,, | Performed by: DERMATOLOGY

## 2021-02-01 PROCEDURE — 99999 PR PBB SHADOW E&M-EST. PATIENT-LVL III: ICD-10-PCS | Mod: PBBFAC,,, | Performed by: DERMATOLOGY

## 2021-02-01 PROCEDURE — 99999 PR PBB SHADOW E&M-EST. PATIENT-LVL III: CPT | Mod: PBBFAC,,, | Performed by: DERMATOLOGY

## 2021-02-01 PROCEDURE — 99212 PR OFFICE/OUTPT VISIT, EST, LEVL II, 10-19 MIN: ICD-10-PCS | Mod: 25,S$GLB,, | Performed by: DERMATOLOGY

## 2021-03-11 ENCOUNTER — OFFICE VISIT (OUTPATIENT)
Dept: DERMATOLOGY | Facility: CLINIC | Age: 41
End: 2021-03-11
Payer: COMMERCIAL

## 2021-03-11 VITALS — WEIGHT: 195 LBS | BODY MASS INDEX: 30.54 KG/M2

## 2021-03-11 DIAGNOSIS — L28.2 PRURIGO: Primary | ICD-10-CM

## 2021-03-11 PROCEDURE — 1126F AMNT PAIN NOTED NONE PRSNT: CPT | Mod: S$GLB,,, | Performed by: DERMATOLOGY

## 2021-03-11 PROCEDURE — 1126F PR PAIN SEVERITY QUANTIFIED, NO PAIN PRESENT: ICD-10-PCS | Mod: S$GLB,,, | Performed by: DERMATOLOGY

## 2021-03-11 PROCEDURE — 3008F BODY MASS INDEX DOCD: CPT | Mod: CPTII,S$GLB,, | Performed by: DERMATOLOGY

## 2021-03-11 PROCEDURE — 99212 OFFICE O/P EST SF 10 MIN: CPT | Mod: 25,S$GLB,, | Performed by: DERMATOLOGY

## 2021-03-11 PROCEDURE — 11900 PR INJECTION INTO SKIN LESIONS, UP TO 7: ICD-10-PCS | Mod: S$GLB,,, | Performed by: DERMATOLOGY

## 2021-03-11 PROCEDURE — 11900 INJECT SKIN LESIONS </W 7: CPT | Mod: S$GLB,,, | Performed by: DERMATOLOGY

## 2021-03-11 PROCEDURE — 3008F PR BODY MASS INDEX (BMI) DOCUMENTED: ICD-10-PCS | Mod: CPTII,S$GLB,, | Performed by: DERMATOLOGY

## 2021-03-11 PROCEDURE — 99212 PR OFFICE/OUTPT VISIT, EST, LEVL II, 10-19 MIN: ICD-10-PCS | Mod: 25,S$GLB,, | Performed by: DERMATOLOGY

## 2021-03-11 PROCEDURE — 99999 PR PBB SHADOW E&M-EST. PATIENT-LVL III: CPT | Mod: PBBFAC,,, | Performed by: DERMATOLOGY

## 2021-03-11 PROCEDURE — 99999 PR PBB SHADOW E&M-EST. PATIENT-LVL III: ICD-10-PCS | Mod: PBBFAC,,, | Performed by: DERMATOLOGY

## 2021-04-13 ENCOUNTER — PATIENT MESSAGE (OUTPATIENT)
Dept: RESEARCH | Facility: HOSPITAL | Age: 41
End: 2021-04-13

## 2021-04-27 ENCOUNTER — OFFICE VISIT (OUTPATIENT)
Dept: DERMATOLOGY | Facility: CLINIC | Age: 41
End: 2021-04-27
Payer: COMMERCIAL

## 2021-04-27 VITALS — BODY MASS INDEX: 30.54 KG/M2 | WEIGHT: 195 LBS

## 2021-04-27 DIAGNOSIS — L28.2 PRURIGO: ICD-10-CM

## 2021-04-27 DIAGNOSIS — D22.9 NEVUS: Primary | ICD-10-CM

## 2021-04-27 PROCEDURE — 3008F PR BODY MASS INDEX (BMI) DOCUMENTED: ICD-10-PCS | Mod: CPTII,S$GLB,, | Performed by: DERMATOLOGY

## 2021-04-27 PROCEDURE — 99999 PR PBB SHADOW E&M-EST. PATIENT-LVL II: ICD-10-PCS | Mod: PBBFAC,,, | Performed by: DERMATOLOGY

## 2021-04-27 PROCEDURE — 99999 PR PBB SHADOW E&M-EST. PATIENT-LVL II: CPT | Mod: PBBFAC,,, | Performed by: DERMATOLOGY

## 2021-04-27 PROCEDURE — 99212 OFFICE O/P EST SF 10 MIN: CPT | Mod: 25,S$GLB,, | Performed by: DERMATOLOGY

## 2021-04-27 PROCEDURE — 1126F AMNT PAIN NOTED NONE PRSNT: CPT | Mod: S$GLB,,, | Performed by: DERMATOLOGY

## 2021-04-27 PROCEDURE — 99212 PR OFFICE/OUTPT VISIT, EST, LEVL II, 10-19 MIN: ICD-10-PCS | Mod: 25,S$GLB,, | Performed by: DERMATOLOGY

## 2021-04-27 PROCEDURE — 3008F BODY MASS INDEX DOCD: CPT | Mod: CPTII,S$GLB,, | Performed by: DERMATOLOGY

## 2021-04-27 PROCEDURE — 11900 PR INJECTION INTO SKIN LESIONS, UP TO 7: ICD-10-PCS | Mod: S$GLB,,, | Performed by: DERMATOLOGY

## 2021-04-27 PROCEDURE — 11900 INJECT SKIN LESIONS </W 7: CPT | Mod: S$GLB,,, | Performed by: DERMATOLOGY

## 2021-04-27 PROCEDURE — 1126F PR PAIN SEVERITY QUANTIFIED, NO PAIN PRESENT: ICD-10-PCS | Mod: S$GLB,,, | Performed by: DERMATOLOGY

## 2021-06-16 ENCOUNTER — OFFICE VISIT (OUTPATIENT)
Dept: DERMATOLOGY | Facility: CLINIC | Age: 41
End: 2021-06-16
Payer: COMMERCIAL

## 2021-06-16 VITALS — WEIGHT: 195 LBS | BODY MASS INDEX: 30.54 KG/M2

## 2021-06-16 DIAGNOSIS — L81.0 POST-INFLAMMATORY HYPERPIGMENTATION: ICD-10-CM

## 2021-06-16 DIAGNOSIS — L28.2 PRURIGO: Primary | ICD-10-CM

## 2021-06-16 PROCEDURE — 1126F PR PAIN SEVERITY QUANTIFIED, NO PAIN PRESENT: ICD-10-PCS | Mod: S$GLB,,, | Performed by: DERMATOLOGY

## 2021-06-16 PROCEDURE — 1126F AMNT PAIN NOTED NONE PRSNT: CPT | Mod: S$GLB,,, | Performed by: DERMATOLOGY

## 2021-06-16 PROCEDURE — 99213 PR OFFICE/OUTPT VISIT, EST, LEVL III, 20-29 MIN: ICD-10-PCS | Mod: 25,S$GLB,, | Performed by: DERMATOLOGY

## 2021-06-16 PROCEDURE — 3008F PR BODY MASS INDEX (BMI) DOCUMENTED: ICD-10-PCS | Mod: CPTII,S$GLB,, | Performed by: DERMATOLOGY

## 2021-06-16 PROCEDURE — 99999 PR PBB SHADOW E&M-EST. PATIENT-LVL II: ICD-10-PCS | Mod: PBBFAC,,, | Performed by: DERMATOLOGY

## 2021-06-16 PROCEDURE — 3008F BODY MASS INDEX DOCD: CPT | Mod: CPTII,S$GLB,, | Performed by: DERMATOLOGY

## 2021-06-16 PROCEDURE — 99999 PR PBB SHADOW E&M-EST. PATIENT-LVL II: CPT | Mod: PBBFAC,,, | Performed by: DERMATOLOGY

## 2021-06-16 PROCEDURE — 11900 PR INJECTION INTO SKIN LESIONS, UP TO 7: ICD-10-PCS | Mod: S$GLB,,, | Performed by: DERMATOLOGY

## 2021-06-16 PROCEDURE — 99213 OFFICE O/P EST LOW 20 MIN: CPT | Mod: 25,S$GLB,, | Performed by: DERMATOLOGY

## 2021-06-16 PROCEDURE — 11900 INJECT SKIN LESIONS </W 7: CPT | Mod: S$GLB,,, | Performed by: DERMATOLOGY

## 2021-07-28 ENCOUNTER — OFFICE VISIT (OUTPATIENT)
Dept: DERMATOLOGY | Facility: CLINIC | Age: 41
End: 2021-07-28
Payer: COMMERCIAL

## 2021-07-28 VITALS — WEIGHT: 195 LBS | BODY MASS INDEX: 30.54 KG/M2

## 2021-07-28 DIAGNOSIS — L28.1 PRURIGO NODULARIS: Primary | ICD-10-CM

## 2021-07-28 PROCEDURE — 88342 IMHCHEM/IMCYTCHM 1ST ANTB: CPT | Performed by: PATHOLOGY

## 2021-07-28 PROCEDURE — 11900 PR INJECTION INTO SKIN LESIONS, UP TO 7: ICD-10-PCS | Mod: 59,S$GLB,, | Performed by: DERMATOLOGY

## 2021-07-28 PROCEDURE — 88342 IMHCHEM/IMCYTCHM 1ST ANTB: CPT | Mod: 26,,, | Performed by: PATHOLOGY

## 2021-07-28 PROCEDURE — 3044F HG A1C LEVEL LT 7.0%: CPT | Mod: CPTII,S$GLB,, | Performed by: DERMATOLOGY

## 2021-07-28 PROCEDURE — 1126F PR PAIN SEVERITY QUANTIFIED, NO PAIN PRESENT: ICD-10-PCS | Mod: CPTII,S$GLB,, | Performed by: DERMATOLOGY

## 2021-07-28 PROCEDURE — 11900 INJECT SKIN LESIONS </W 7: CPT | Mod: 59,S$GLB,, | Performed by: DERMATOLOGY

## 2021-07-28 PROCEDURE — 88305 TISSUE EXAM BY PATHOLOGIST: ICD-10-PCS | Mod: 26,,, | Performed by: PATHOLOGY

## 2021-07-28 PROCEDURE — 99213 OFFICE O/P EST LOW 20 MIN: CPT | Mod: 25,S$GLB,, | Performed by: DERMATOLOGY

## 2021-07-28 PROCEDURE — 99213 PR OFFICE/OUTPT VISIT, EST, LEVL III, 20-29 MIN: ICD-10-PCS | Mod: 25,S$GLB,, | Performed by: DERMATOLOGY

## 2021-07-28 PROCEDURE — 88312 PR  SPECIAL STAINS,GROUP I: ICD-10-PCS | Mod: 26,,, | Performed by: PATHOLOGY

## 2021-07-28 PROCEDURE — 3044F PR MOST RECENT HEMOGLOBIN A1C LEVEL <7.0%: ICD-10-PCS | Mod: CPTII,S$GLB,, | Performed by: DERMATOLOGY

## 2021-07-28 PROCEDURE — 88312 SPECIAL STAINS GROUP 1: CPT | Performed by: PATHOLOGY

## 2021-07-28 PROCEDURE — 1160F PR REVIEW ALL MEDS BY PRESCRIBER/CLIN PHARMACIST DOCUMENTED: ICD-10-PCS | Mod: CPTII,S$GLB,, | Performed by: DERMATOLOGY

## 2021-07-28 PROCEDURE — 99999 PR PBB SHADOW E&M-EST. PATIENT-LVL III: CPT | Mod: PBBFAC,,, | Performed by: DERMATOLOGY

## 2021-07-28 PROCEDURE — 1159F PR MEDICATION LIST DOCUMENTED IN MEDICAL RECORD: ICD-10-PCS | Mod: CPTII,S$GLB,, | Performed by: DERMATOLOGY

## 2021-07-28 PROCEDURE — 88342 CHG IMMUNOCYTOCHEMISTRY: ICD-10-PCS | Mod: 26,,, | Performed by: PATHOLOGY

## 2021-07-28 PROCEDURE — 88305 TISSUE EXAM BY PATHOLOGIST: CPT | Mod: 26,,, | Performed by: PATHOLOGY

## 2021-07-28 PROCEDURE — 1126F AMNT PAIN NOTED NONE PRSNT: CPT | Mod: CPTII,S$GLB,, | Performed by: DERMATOLOGY

## 2021-07-28 PROCEDURE — 11102 PR TANGENTIAL BIOPSY, SKIN, SINGLE LESION: ICD-10-PCS | Mod: S$GLB,,, | Performed by: DERMATOLOGY

## 2021-07-28 PROCEDURE — 88305 TISSUE EXAM BY PATHOLOGIST: CPT | Performed by: PATHOLOGY

## 2021-07-28 PROCEDURE — 1159F MED LIST DOCD IN RCRD: CPT | Mod: CPTII,S$GLB,, | Performed by: DERMATOLOGY

## 2021-07-28 PROCEDURE — 1160F RVW MEDS BY RX/DR IN RCRD: CPT | Mod: CPTII,S$GLB,, | Performed by: DERMATOLOGY

## 2021-07-28 PROCEDURE — 88312 SPECIAL STAINS GROUP 1: CPT | Mod: 26,,, | Performed by: PATHOLOGY

## 2021-07-28 PROCEDURE — 11102 TANGNTL BX SKIN SINGLE LES: CPT | Mod: S$GLB,,, | Performed by: DERMATOLOGY

## 2021-07-28 PROCEDURE — 3008F PR BODY MASS INDEX (BMI) DOCUMENTED: ICD-10-PCS | Mod: CPTII,S$GLB,, | Performed by: DERMATOLOGY

## 2021-07-28 PROCEDURE — 99999 PR PBB SHADOW E&M-EST. PATIENT-LVL III: ICD-10-PCS | Mod: PBBFAC,,, | Performed by: DERMATOLOGY

## 2021-07-28 PROCEDURE — 3008F BODY MASS INDEX DOCD: CPT | Mod: CPTII,S$GLB,, | Performed by: DERMATOLOGY

## 2021-08-03 LAB
FINAL PATHOLOGIC DIAGNOSIS: NORMAL
GROSS: NORMAL
Lab: NORMAL
MICROSCOPIC EXAM: NORMAL

## 2021-08-17 ENCOUNTER — OFFICE VISIT (OUTPATIENT)
Dept: DERMATOLOGY | Facility: CLINIC | Age: 41
End: 2021-08-17
Payer: COMMERCIAL

## 2021-08-17 VITALS — WEIGHT: 195 LBS | BODY MASS INDEX: 30.54 KG/M2

## 2021-08-17 DIAGNOSIS — L28.2 PRURIGO: ICD-10-CM

## 2021-08-17 PROCEDURE — 99999 PR PBB SHADOW E&M-EST. PATIENT-LVL III: ICD-10-PCS | Mod: PBBFAC,,, | Performed by: DERMATOLOGY

## 2021-08-17 PROCEDURE — 1126F AMNT PAIN NOTED NONE PRSNT: CPT | Mod: CPTII,S$GLB,, | Performed by: DERMATOLOGY

## 2021-08-17 PROCEDURE — 3044F PR MOST RECENT HEMOGLOBIN A1C LEVEL <7.0%: ICD-10-PCS | Mod: CPTII,S$GLB,, | Performed by: DERMATOLOGY

## 2021-08-17 PROCEDURE — 3008F PR BODY MASS INDEX (BMI) DOCUMENTED: ICD-10-PCS | Mod: CPTII,S$GLB,, | Performed by: DERMATOLOGY

## 2021-08-17 PROCEDURE — 3044F HG A1C LEVEL LT 7.0%: CPT | Mod: CPTII,S$GLB,, | Performed by: DERMATOLOGY

## 2021-08-17 PROCEDURE — 1126F PR PAIN SEVERITY QUANTIFIED, NO PAIN PRESENT: ICD-10-PCS | Mod: CPTII,S$GLB,, | Performed by: DERMATOLOGY

## 2021-08-17 PROCEDURE — 99213 OFFICE O/P EST LOW 20 MIN: CPT | Mod: 25,S$GLB,, | Performed by: DERMATOLOGY

## 2021-08-17 PROCEDURE — 1159F PR MEDICATION LIST DOCUMENTED IN MEDICAL RECORD: ICD-10-PCS | Mod: CPTII,S$GLB,, | Performed by: DERMATOLOGY

## 2021-08-17 PROCEDURE — 99999 PR PBB SHADOW E&M-EST. PATIENT-LVL III: CPT | Mod: PBBFAC,,, | Performed by: DERMATOLOGY

## 2021-08-17 PROCEDURE — 11900 PR INJECTION INTO SKIN LESIONS, UP TO 7: ICD-10-PCS | Mod: S$GLB,,, | Performed by: DERMATOLOGY

## 2021-08-17 PROCEDURE — 99213 PR OFFICE/OUTPT VISIT, EST, LEVL III, 20-29 MIN: ICD-10-PCS | Mod: 25,S$GLB,, | Performed by: DERMATOLOGY

## 2021-08-17 PROCEDURE — 1159F MED LIST DOCD IN RCRD: CPT | Mod: CPTII,S$GLB,, | Performed by: DERMATOLOGY

## 2021-08-17 PROCEDURE — 11900 INJECT SKIN LESIONS </W 7: CPT | Mod: S$GLB,,, | Performed by: DERMATOLOGY

## 2021-08-17 PROCEDURE — 3008F BODY MASS INDEX DOCD: CPT | Mod: CPTII,S$GLB,, | Performed by: DERMATOLOGY

## 2021-08-17 PROCEDURE — 1160F RVW MEDS BY RX/DR IN RCRD: CPT | Mod: CPTII,S$GLB,, | Performed by: DERMATOLOGY

## 2021-08-17 PROCEDURE — 1160F PR REVIEW ALL MEDS BY PRESCRIBER/CLIN PHARMACIST DOCUMENTED: ICD-10-PCS | Mod: CPTII,S$GLB,, | Performed by: DERMATOLOGY

## 2021-08-17 RX ORDER — TACROLIMUS 1 MG/G
OINTMENT TOPICAL 2 TIMES DAILY
Qty: 60 G | Refills: 3 | Status: SHIPPED | OUTPATIENT
Start: 2021-08-17

## 2021-09-22 ENCOUNTER — PATIENT OUTREACH (OUTPATIENT)
Dept: ADMINISTRATIVE | Facility: OTHER | Age: 41
End: 2021-09-22

## 2021-09-23 ENCOUNTER — OFFICE VISIT (OUTPATIENT)
Dept: DERMATOLOGY | Facility: CLINIC | Age: 41
End: 2021-09-23
Payer: COMMERCIAL

## 2021-09-23 VITALS — BODY MASS INDEX: 30.54 KG/M2 | WEIGHT: 195 LBS

## 2021-09-23 DIAGNOSIS — L28.2 PRURIGO: Primary | ICD-10-CM

## 2021-09-23 PROCEDURE — 1160F PR REVIEW ALL MEDS BY PRESCRIBER/CLIN PHARMACIST DOCUMENTED: ICD-10-PCS | Mod: CPTII,S$GLB,, | Performed by: DERMATOLOGY

## 2021-09-23 PROCEDURE — 99213 OFFICE O/P EST LOW 20 MIN: CPT | Mod: 25,S$GLB,, | Performed by: DERMATOLOGY

## 2021-09-23 PROCEDURE — 99999 PR PBB SHADOW E&M-EST. PATIENT-LVL II: ICD-10-PCS | Mod: PBBFAC,,, | Performed by: DERMATOLOGY

## 2021-09-23 PROCEDURE — 11900 PR INJECTION INTO SKIN LESIONS, UP TO 7: ICD-10-PCS | Mod: S$GLB,,, | Performed by: DERMATOLOGY

## 2021-09-23 PROCEDURE — 3044F PR MOST RECENT HEMOGLOBIN A1C LEVEL <7.0%: ICD-10-PCS | Mod: CPTII,S$GLB,, | Performed by: DERMATOLOGY

## 2021-09-23 PROCEDURE — 3008F BODY MASS INDEX DOCD: CPT | Mod: CPTII,S$GLB,, | Performed by: DERMATOLOGY

## 2021-09-23 PROCEDURE — 1159F PR MEDICATION LIST DOCUMENTED IN MEDICAL RECORD: ICD-10-PCS | Mod: CPTII,S$GLB,, | Performed by: DERMATOLOGY

## 2021-09-23 PROCEDURE — 1160F RVW MEDS BY RX/DR IN RCRD: CPT | Mod: CPTII,S$GLB,, | Performed by: DERMATOLOGY

## 2021-09-23 PROCEDURE — 1159F MED LIST DOCD IN RCRD: CPT | Mod: CPTII,S$GLB,, | Performed by: DERMATOLOGY

## 2021-09-23 PROCEDURE — 11900 INJECT SKIN LESIONS </W 7: CPT | Mod: S$GLB,,, | Performed by: DERMATOLOGY

## 2021-09-23 PROCEDURE — 99999 PR PBB SHADOW E&M-EST. PATIENT-LVL II: CPT | Mod: PBBFAC,,, | Performed by: DERMATOLOGY

## 2021-09-23 PROCEDURE — 3044F HG A1C LEVEL LT 7.0%: CPT | Mod: CPTII,S$GLB,, | Performed by: DERMATOLOGY

## 2021-09-23 PROCEDURE — 99213 PR OFFICE/OUTPT VISIT, EST, LEVL III, 20-29 MIN: ICD-10-PCS | Mod: 25,S$GLB,, | Performed by: DERMATOLOGY

## 2021-09-23 PROCEDURE — 3008F PR BODY MASS INDEX (BMI) DOCUMENTED: ICD-10-PCS | Mod: CPTII,S$GLB,, | Performed by: DERMATOLOGY

## 2021-10-06 ENCOUNTER — OFFICE VISIT (OUTPATIENT)
Dept: DERMATOLOGY | Facility: CLINIC | Age: 41
End: 2021-10-06
Payer: COMMERCIAL

## 2021-10-06 VITALS — BODY MASS INDEX: 30.54 KG/M2 | WEIGHT: 195 LBS

## 2021-10-06 DIAGNOSIS — L28.2 PRURIGO: Primary | ICD-10-CM

## 2021-10-06 DIAGNOSIS — D22.9 NEVUS: ICD-10-CM

## 2021-10-06 PROCEDURE — 1160F PR REVIEW ALL MEDS BY PRESCRIBER/CLIN PHARMACIST DOCUMENTED: ICD-10-PCS | Mod: CPTII,S$GLB,, | Performed by: DERMATOLOGY

## 2021-10-06 PROCEDURE — 99999 PR PBB SHADOW E&M-EST. PATIENT-LVL II: ICD-10-PCS | Mod: PBBFAC,,, | Performed by: DERMATOLOGY

## 2021-10-06 PROCEDURE — 99999 PR PBB SHADOW E&M-EST. PATIENT-LVL II: CPT | Mod: PBBFAC,,, | Performed by: DERMATOLOGY

## 2021-10-06 PROCEDURE — 99213 OFFICE O/P EST LOW 20 MIN: CPT | Mod: 25,S$GLB,, | Performed by: DERMATOLOGY

## 2021-10-06 PROCEDURE — 1160F RVW MEDS BY RX/DR IN RCRD: CPT | Mod: CPTII,S$GLB,, | Performed by: DERMATOLOGY

## 2021-10-06 PROCEDURE — 11900 PR INJECTION INTO SKIN LESIONS, UP TO 7: ICD-10-PCS | Mod: S$GLB,,, | Performed by: DERMATOLOGY

## 2021-10-06 PROCEDURE — 1159F PR MEDICATION LIST DOCUMENTED IN MEDICAL RECORD: ICD-10-PCS | Mod: CPTII,S$GLB,, | Performed by: DERMATOLOGY

## 2021-10-06 PROCEDURE — 3008F PR BODY MASS INDEX (BMI) DOCUMENTED: ICD-10-PCS | Mod: CPTII,S$GLB,, | Performed by: DERMATOLOGY

## 2021-10-06 PROCEDURE — 99213 PR OFFICE/OUTPT VISIT, EST, LEVL III, 20-29 MIN: ICD-10-PCS | Mod: 25,S$GLB,, | Performed by: DERMATOLOGY

## 2021-10-06 PROCEDURE — 3044F PR MOST RECENT HEMOGLOBIN A1C LEVEL <7.0%: ICD-10-PCS | Mod: CPTII,S$GLB,, | Performed by: DERMATOLOGY

## 2021-10-06 PROCEDURE — 3008F BODY MASS INDEX DOCD: CPT | Mod: CPTII,S$GLB,, | Performed by: DERMATOLOGY

## 2021-10-06 PROCEDURE — 3044F HG A1C LEVEL LT 7.0%: CPT | Mod: CPTII,S$GLB,, | Performed by: DERMATOLOGY

## 2021-10-06 PROCEDURE — 11900 INJECT SKIN LESIONS </W 7: CPT | Mod: S$GLB,,, | Performed by: DERMATOLOGY

## 2021-10-06 PROCEDURE — 1159F MED LIST DOCD IN RCRD: CPT | Mod: CPTII,S$GLB,, | Performed by: DERMATOLOGY

## 2021-10-07 ENCOUNTER — TELEPHONE (OUTPATIENT)
Dept: OTOLARYNGOLOGY | Facility: CLINIC | Age: 41
End: 2021-10-07

## 2021-10-12 ENCOUNTER — OFFICE VISIT (OUTPATIENT)
Dept: OTOLARYNGOLOGY | Facility: CLINIC | Age: 41
End: 2021-10-12
Payer: COMMERCIAL

## 2021-10-12 VITALS
SYSTOLIC BLOOD PRESSURE: 111 MMHG | BODY MASS INDEX: 29.98 KG/M2 | HEART RATE: 81 BPM | TEMPERATURE: 97 F | WEIGHT: 191 LBS | HEIGHT: 67 IN | DIASTOLIC BLOOD PRESSURE: 75 MMHG

## 2021-10-12 DIAGNOSIS — D22.30 NEVUS OF FACE: Primary | ICD-10-CM

## 2021-10-12 PROCEDURE — 99204 PR OFFICE/OUTPT VISIT, NEW, LEVL IV, 45-59 MIN: ICD-10-PCS | Mod: S$GLB,,, | Performed by: OTOLARYNGOLOGY

## 2021-10-12 PROCEDURE — 3008F PR BODY MASS INDEX (BMI) DOCUMENTED: ICD-10-PCS | Mod: CPTII,S$GLB,, | Performed by: OTOLARYNGOLOGY

## 2021-10-12 PROCEDURE — 3078F PR MOST RECENT DIASTOLIC BLOOD PRESSURE < 80 MM HG: ICD-10-PCS | Mod: CPTII,S$GLB,, | Performed by: OTOLARYNGOLOGY

## 2021-10-12 PROCEDURE — 3074F SYST BP LT 130 MM HG: CPT | Mod: CPTII,S$GLB,, | Performed by: OTOLARYNGOLOGY

## 2021-10-12 PROCEDURE — 99204 OFFICE O/P NEW MOD 45 MIN: CPT | Mod: S$GLB,,, | Performed by: OTOLARYNGOLOGY

## 2021-10-12 PROCEDURE — 1159F MED LIST DOCD IN RCRD: CPT | Mod: CPTII,S$GLB,, | Performed by: OTOLARYNGOLOGY

## 2021-10-12 PROCEDURE — 1159F PR MEDICATION LIST DOCUMENTED IN MEDICAL RECORD: ICD-10-PCS | Mod: CPTII,S$GLB,, | Performed by: OTOLARYNGOLOGY

## 2021-10-12 PROCEDURE — 3044F PR MOST RECENT HEMOGLOBIN A1C LEVEL <7.0%: ICD-10-PCS | Mod: CPTII,S$GLB,, | Performed by: OTOLARYNGOLOGY

## 2021-10-12 PROCEDURE — 3044F HG A1C LEVEL LT 7.0%: CPT | Mod: CPTII,S$GLB,, | Performed by: OTOLARYNGOLOGY

## 2021-10-12 PROCEDURE — 3008F BODY MASS INDEX DOCD: CPT | Mod: CPTII,S$GLB,, | Performed by: OTOLARYNGOLOGY

## 2021-10-12 PROCEDURE — 3074F PR MOST RECENT SYSTOLIC BLOOD PRESSURE < 130 MM HG: ICD-10-PCS | Mod: CPTII,S$GLB,, | Performed by: OTOLARYNGOLOGY

## 2021-10-12 PROCEDURE — 3078F DIAST BP <80 MM HG: CPT | Mod: CPTII,S$GLB,, | Performed by: OTOLARYNGOLOGY

## 2021-11-16 ENCOUNTER — OFFICE VISIT (OUTPATIENT)
Dept: DERMATOLOGY | Facility: CLINIC | Age: 41
End: 2021-11-16
Payer: COMMERCIAL

## 2021-11-16 VITALS — WEIGHT: 191 LBS | BODY MASS INDEX: 29.91 KG/M2

## 2021-11-16 DIAGNOSIS — L28.2 PRURIGO: ICD-10-CM

## 2021-11-16 PROCEDURE — 3008F BODY MASS INDEX DOCD: CPT | Mod: CPTII,S$GLB,, | Performed by: DERMATOLOGY

## 2021-11-16 PROCEDURE — 1160F PR REVIEW ALL MEDS BY PRESCRIBER/CLIN PHARMACIST DOCUMENTED: ICD-10-PCS | Mod: CPTII,S$GLB,, | Performed by: DERMATOLOGY

## 2021-11-16 PROCEDURE — 99212 OFFICE O/P EST SF 10 MIN: CPT | Mod: 25,S$GLB,, | Performed by: DERMATOLOGY

## 2021-11-16 PROCEDURE — 99999 PR PBB SHADOW E&M-EST. PATIENT-LVL II: CPT | Mod: PBBFAC,,, | Performed by: DERMATOLOGY

## 2021-11-16 PROCEDURE — 3044F HG A1C LEVEL LT 7.0%: CPT | Mod: CPTII,S$GLB,, | Performed by: DERMATOLOGY

## 2021-11-16 PROCEDURE — 11900 PR INJECTION INTO SKIN LESIONS, UP TO 7: ICD-10-PCS | Mod: S$GLB,,, | Performed by: DERMATOLOGY

## 2021-11-16 PROCEDURE — 3008F PR BODY MASS INDEX (BMI) DOCUMENTED: ICD-10-PCS | Mod: CPTII,S$GLB,, | Performed by: DERMATOLOGY

## 2021-11-16 PROCEDURE — 1159F MED LIST DOCD IN RCRD: CPT | Mod: CPTII,S$GLB,, | Performed by: DERMATOLOGY

## 2021-11-16 PROCEDURE — 11900 INJECT SKIN LESIONS </W 7: CPT | Mod: S$GLB,,, | Performed by: DERMATOLOGY

## 2021-11-16 PROCEDURE — 99999 PR PBB SHADOW E&M-EST. PATIENT-LVL II: ICD-10-PCS | Mod: PBBFAC,,, | Performed by: DERMATOLOGY

## 2021-11-16 PROCEDURE — 3044F PR MOST RECENT HEMOGLOBIN A1C LEVEL <7.0%: ICD-10-PCS | Mod: CPTII,S$GLB,, | Performed by: DERMATOLOGY

## 2021-11-16 PROCEDURE — 1160F RVW MEDS BY RX/DR IN RCRD: CPT | Mod: CPTII,S$GLB,, | Performed by: DERMATOLOGY

## 2021-11-16 PROCEDURE — 99212 PR OFFICE/OUTPT VISIT, EST, LEVL II, 10-19 MIN: ICD-10-PCS | Mod: 25,S$GLB,, | Performed by: DERMATOLOGY

## 2021-11-16 PROCEDURE — 1159F PR MEDICATION LIST DOCUMENTED IN MEDICAL RECORD: ICD-10-PCS | Mod: CPTII,S$GLB,, | Performed by: DERMATOLOGY

## 2021-12-08 ENCOUNTER — CLINICAL SUPPORT (OUTPATIENT)
Dept: FAMILY MEDICINE | Facility: CLINIC | Age: 41
End: 2021-12-08
Payer: COMMERCIAL

## 2021-12-08 DIAGNOSIS — Z23 NEED FOR INFLUENZA VACCINATION: Primary | ICD-10-CM

## 2021-12-08 PROCEDURE — 90686 FLU VACCINE (QUAD) GREATER THAN OR EQUAL TO 3YO PRESERVATIVE FREE IM: ICD-10-PCS | Mod: S$GLB,,, | Performed by: NURSE PRACTITIONER

## 2021-12-08 PROCEDURE — 90471 FLU VACCINE (QUAD) GREATER THAN OR EQUAL TO 3YO PRESERVATIVE FREE IM: ICD-10-PCS | Mod: S$GLB,,, | Performed by: NURSE PRACTITIONER

## 2021-12-08 PROCEDURE — 90686 IIV4 VACC NO PRSV 0.5 ML IM: CPT | Mod: S$GLB,,, | Performed by: NURSE PRACTITIONER

## 2021-12-08 PROCEDURE — 90471 IMMUNIZATION ADMIN: CPT | Mod: S$GLB,,, | Performed by: NURSE PRACTITIONER

## 2021-12-21 ENCOUNTER — TELEPHONE (OUTPATIENT)
Dept: OBSTETRICS AND GYNECOLOGY | Facility: CLINIC | Age: 41
End: 2021-12-21
Payer: COMMERCIAL

## 2022-01-07 ENCOUNTER — OFFICE VISIT (OUTPATIENT)
Dept: DERMATOLOGY | Facility: CLINIC | Age: 42
End: 2022-01-07
Payer: COMMERCIAL

## 2022-01-07 VITALS — BODY MASS INDEX: 29.91 KG/M2 | WEIGHT: 191 LBS

## 2022-01-07 DIAGNOSIS — L28.2 PRURIGO: Primary | ICD-10-CM

## 2022-01-07 DIAGNOSIS — L70.9 ACNE, UNSPECIFIED ACNE TYPE: ICD-10-CM

## 2022-01-07 PROCEDURE — 99999 PR PBB SHADOW E&M-EST. PATIENT-LVL II: CPT | Mod: PBBFAC,,, | Performed by: DERMATOLOGY

## 2022-01-07 PROCEDURE — 3008F PR BODY MASS INDEX (BMI) DOCUMENTED: ICD-10-PCS | Mod: CPTII,S$GLB,, | Performed by: DERMATOLOGY

## 2022-01-07 PROCEDURE — 11900 PR INJECTION INTO SKIN LESIONS, UP TO 7: ICD-10-PCS | Mod: S$GLB,,, | Performed by: DERMATOLOGY

## 2022-01-07 PROCEDURE — 1160F RVW MEDS BY RX/DR IN RCRD: CPT | Mod: CPTII,S$GLB,, | Performed by: DERMATOLOGY

## 2022-01-07 PROCEDURE — 1159F PR MEDICATION LIST DOCUMENTED IN MEDICAL RECORD: ICD-10-PCS | Mod: CPTII,S$GLB,, | Performed by: DERMATOLOGY

## 2022-01-07 PROCEDURE — 11900 INJECT SKIN LESIONS </W 7: CPT | Mod: S$GLB,,, | Performed by: DERMATOLOGY

## 2022-01-07 PROCEDURE — 99214 PR OFFICE/OUTPT VISIT, EST, LEVL IV, 30-39 MIN: ICD-10-PCS | Mod: 25,S$GLB,, | Performed by: DERMATOLOGY

## 2022-01-07 PROCEDURE — 3008F BODY MASS INDEX DOCD: CPT | Mod: CPTII,S$GLB,, | Performed by: DERMATOLOGY

## 2022-01-07 PROCEDURE — 1159F MED LIST DOCD IN RCRD: CPT | Mod: CPTII,S$GLB,, | Performed by: DERMATOLOGY

## 2022-01-07 PROCEDURE — 99999 PR PBB SHADOW E&M-EST. PATIENT-LVL II: ICD-10-PCS | Mod: PBBFAC,,, | Performed by: DERMATOLOGY

## 2022-01-07 PROCEDURE — 99214 OFFICE O/P EST MOD 30 MIN: CPT | Mod: 25,S$GLB,, | Performed by: DERMATOLOGY

## 2022-01-07 PROCEDURE — 1160F PR REVIEW ALL MEDS BY PRESCRIBER/CLIN PHARMACIST DOCUMENTED: ICD-10-PCS | Mod: CPTII,S$GLB,, | Performed by: DERMATOLOGY

## 2022-01-07 NOTE — PROGRESS NOTES
Subjective:       Patient ID:  Meri Cosme is a 41 y.o. female who presents for   Chief Complaint   Patient presents with    Lesion     New spots     Follow up prurigo, new spots on arms and legs.  Also had a milium cyst under her left eye which she picked out and has few cysts on forehead, excoriations under chin.       Review of Systems   Constitutional: Negative for fever, chills, weight loss, weight gain, fatigue, night sweats and malaise.   Skin: Positive for wears hat. Negative for daily sunscreen use and activity-related sunscreen use.   Hematologic/Lymphatic: Bruises/bleeds easily.        Objective:    Physical Exam   Constitutional: She appears well-developed and well-nourished.   Neurological: She is alert and oriented to person, place, and time.   Psychiatric: She has a normal mood and affect.   Skin:   Areas Examined (abnormalities noted in diagram):   Head / Face Inspection Performed  Neck Inspection Performed  LUE Inspection Performed  RLE Inspected  LLE Inspection Performed                       Diagram Legend     Erythematous scaling macule/papule c/w actinic keratosis       Vascular papule c/w angioma      Pigmented verrucoid papule/plaque c/w seborrheic keratosis      Yellow umbilicated papule c/w sebaceous hyperplasia      Irregularly shaped tan macule c/w lentigo     1-2 mm smooth white papules consistent with Milia      Movable subcutaneous cyst with punctum c/w epidermal inclusion cyst      Subcutaneous movable cyst c/w pilar cyst      Firm pink to brown papule c/w dermatofibroma      Pedunculated fleshy papule(s) c/w skin tag(s)      Evenly pigmented macule c/w junctional nevus     Mildly variegated pigmented, slightly irregular-bordered macule c/w mildly atypical nevus      Flesh colored to evenly pigmented papule c/w intradermal nevus       Pink pearly papule/plaque c/w basal cell carcinoma      Erythematous hyperkeratotic cursted plaque c/w SCC      Surgical scar with no sign of skin  cancer recurrence      Open and closed comedones      Inflammatory papules and pustules      Verrucoid papule consistent consistent with wart     Erythematous eczematous patches and plaques     Dystrophic onycholytic nail with subungual debris c/w onychomycosis     Umbilicated papule    Erythematous-base heme-crusted tan verrucoid plaque consistent with inflamed seborrheic keratosis     Erythematous Silvery Scaling Plaque c/w Psoriasis     See annotation      Assessment / Plan:        Prurigo  Intralesional Kenalog 5mg/cc (0.6 cc total) injected into 7 lesions on the arm and leg today after obtaining verbal consent including risk of surrounding hypopigmentation. Patient tolerated procedure well.    Units: 1  NDC for Kenalog 10mg/cc:  2402-8979-93    cerave itch relief lotion otc      Acne, unspecified acne type  Try zilixi foam, call if desires rx    Also given sample of eucrisa to use on the area of erythema under left eye             Follow up in about 3 months (around 4/7/2022).

## 2022-01-08 ENCOUNTER — TELEPHONE (OUTPATIENT)
Dept: DERMATOLOGY | Facility: CLINIC | Age: 42
End: 2022-01-08
Payer: COMMERCIAL

## 2022-01-08 NOTE — TELEPHONE ENCOUNTER
[facility]  paged me that Ms. Cosme called requesting a return call from the on-call dermatologist about a severe rash. I returned Ms. Cosme's phone call who stated she had a new severe rash and wanted to know if she could take Benadryl. I told her that I cannot advise her on treatment options without a full in-person assessment. I recommended that if she felt the rash was severe, she should go to her nearest emergency room for an evaluation. I also offered to schedule her in clinic this week for an assessment. The patient stated she will go to the emergency room and ended the call.    Olivia Camargo MD  Dermatology PGY3

## 2022-01-10 ENCOUNTER — OFFICE VISIT (OUTPATIENT)
Dept: FAMILY MEDICINE | Facility: CLINIC | Age: 42
End: 2022-01-10
Attending: FAMILY MEDICINE
Payer: COMMERCIAL

## 2022-01-10 ENCOUNTER — PATIENT MESSAGE (OUTPATIENT)
Dept: ADMINISTRATIVE | Facility: HOSPITAL | Age: 42
End: 2022-01-10
Payer: COMMERCIAL

## 2022-01-10 VITALS
TEMPERATURE: 99 F | WEIGHT: 185 LBS | DIASTOLIC BLOOD PRESSURE: 72 MMHG | BODY MASS INDEX: 29.03 KG/M2 | SYSTOLIC BLOOD PRESSURE: 102 MMHG | HEIGHT: 67 IN | HEART RATE: 77 BPM | OXYGEN SATURATION: 97 %

## 2022-01-10 DIAGNOSIS — M79.606 PAIN OF LOWER EXTREMITY, UNSPECIFIED LATERALITY: ICD-10-CM

## 2022-01-10 DIAGNOSIS — E55.9 VITAMIN D DEFICIENCY: ICD-10-CM

## 2022-01-10 DIAGNOSIS — Z00.00 ROUTINE GENERAL MEDICAL EXAMINATION AT HEALTH CARE FACILITY: Primary | ICD-10-CM

## 2022-01-10 DIAGNOSIS — D50.9 IRON DEFICIENCY ANEMIA, UNSPECIFIED IRON DEFICIENCY ANEMIA TYPE: ICD-10-CM

## 2022-01-10 PROCEDURE — 99999 PR PBB SHADOW E&M-EST. PATIENT-LVL III: ICD-10-PCS | Mod: PBBFAC,,, | Performed by: FAMILY MEDICINE

## 2022-01-10 PROCEDURE — 99396 PR PREVENTIVE VISIT,EST,40-64: ICD-10-PCS | Mod: S$GLB,,, | Performed by: FAMILY MEDICINE

## 2022-01-10 PROCEDURE — 3074F PR MOST RECENT SYSTOLIC BLOOD PRESSURE < 130 MM HG: ICD-10-PCS | Mod: CPTII,S$GLB,, | Performed by: FAMILY MEDICINE

## 2022-01-10 PROCEDURE — 99396 PREV VISIT EST AGE 40-64: CPT | Mod: S$GLB,,, | Performed by: FAMILY MEDICINE

## 2022-01-10 PROCEDURE — 99999 PR PBB SHADOW E&M-EST. PATIENT-LVL III: CPT | Mod: PBBFAC,,, | Performed by: FAMILY MEDICINE

## 2022-01-10 PROCEDURE — 1160F PR REVIEW ALL MEDS BY PRESCRIBER/CLIN PHARMACIST DOCUMENTED: ICD-10-PCS | Mod: CPTII,S$GLB,, | Performed by: FAMILY MEDICINE

## 2022-01-10 PROCEDURE — 1159F PR MEDICATION LIST DOCUMENTED IN MEDICAL RECORD: ICD-10-PCS | Mod: CPTII,S$GLB,, | Performed by: FAMILY MEDICINE

## 2022-01-10 PROCEDURE — 3078F PR MOST RECENT DIASTOLIC BLOOD PRESSURE < 80 MM HG: ICD-10-PCS | Mod: CPTII,S$GLB,, | Performed by: FAMILY MEDICINE

## 2022-01-10 PROCEDURE — 3078F DIAST BP <80 MM HG: CPT | Mod: CPTII,S$GLB,, | Performed by: FAMILY MEDICINE

## 2022-01-10 PROCEDURE — 3008F BODY MASS INDEX DOCD: CPT | Mod: CPTII,S$GLB,, | Performed by: FAMILY MEDICINE

## 2022-01-10 PROCEDURE — 1159F MED LIST DOCD IN RCRD: CPT | Mod: CPTII,S$GLB,, | Performed by: FAMILY MEDICINE

## 2022-01-10 PROCEDURE — 3008F PR BODY MASS INDEX (BMI) DOCUMENTED: ICD-10-PCS | Mod: CPTII,S$GLB,, | Performed by: FAMILY MEDICINE

## 2022-01-10 PROCEDURE — 1160F RVW MEDS BY RX/DR IN RCRD: CPT | Mod: CPTII,S$GLB,, | Performed by: FAMILY MEDICINE

## 2022-01-10 PROCEDURE — 3074F SYST BP LT 130 MM HG: CPT | Mod: CPTII,S$GLB,, | Performed by: FAMILY MEDICINE

## 2022-01-10 NOTE — PROGRESS NOTES
Subjective:       Patient ID: Meri Cosme is a 41 y.o. female.    Chief Complaint: Annual Exam    41 yr old pleasant female with family h/o DM, anemia iron deficiency, and no other significant medical history presents today for 3 month check and lab work.  C/o leg pain intermittent. Has some history of blood clots in family so she wanted to rule out.    Iron deficiency anemia - chronic - has heavy cycles - she had issues recently and needed transfusion - her H/H is table and improving now - asymptomatic       She exercises regularly and eats healthy      History as below - reviewed in detail      HM  -declined vaccines  -labs due    Anemia  Presents for follow-up visit. There has been no abdominal pain, bruising/bleeding easily, confusion, fever, light-headedness, pallor, palpitations, pica or weight loss. Signs of blood loss that are not present include hematemesis, melena and vaginal bleeding. Past treatments include changes in diet and oral iron supplements. There is no history of alcohol abuse, cancer, chronic renal disease, dementia, heart failure, HIV/AIDS, inflammatory bowel disease, malabsorption, neuropathy, recent surgery or rheumatic disease. There is no past history of bone marrow exam, EGD or FOBT. Compliance with medications is %.     Review of Systems   Constitutional: Negative.  Negative for activity change, diaphoresis, fever, unexpected weight change and weight loss.   HENT: Negative.  Negative for nasal congestion, ear discharge, hearing loss, rhinorrhea, sore throat and voice change.    Eyes: Negative.  Negative for pain, discharge and visual disturbance.   Respiratory: Negative.  Negative for chest tightness, shortness of breath and wheezing.    Cardiovascular: Negative.  Negative for chest pain and palpitations.   Gastrointestinal: Negative.  Negative for abdominal distention, abdominal pain, anal bleeding, constipation, hematemesis, melena and nausea.   Endocrine: Negative.  Negative  for cold intolerance, polydipsia and polyuria.   Genitourinary: Negative.  Negative for decreased urine volume, difficulty urinating, dysuria, frequency, menstrual problem, vaginal bleeding and vaginal pain.   Musculoskeletal: Positive for leg pain and myalgias. Negative for arthralgias and gait problem.   Integumentary:  Negative for color change, pallor and wound. Negative.   Allergic/Immunologic: Negative.  Negative for environmental allergies and immunocompromised state.   Neurological: Negative.  Negative for dizziness, tremors, seizures, speech difficulty, light-headedness and headaches.   Hematological: Negative.  Negative for adenopathy. Does not bruise/bleed easily.   Psychiatric/Behavioral: Negative.  Negative for agitation, confusion, decreased concentration, hallucinations, self-injury and suicidal ideas. The patient is not nervous/anxious.      Active Ambulatory Problems     Diagnosis Date Noted    History of syncope 2015    Chest pain, central 2015    BMI 25.0-25.9,adult 2015    Syncope and collapse 2015    Chest pain 2015    Pre-syncope 2015    Iron deficiency anemia 10/01/2019    Vitamin D deficiency 10/01/2019     Resolved Ambulatory Problems     Diagnosis Date Noted    Symptomatic anemia 2019     Past Medical History:   Diagnosis Date    Heart disease      Past Surgical History:   Procedure Laterality Date    BREAST RECONSTRUCTION Bilateral 2014    lift     SECTION       Family History   Problem Relation Age of Onset    Heart disease Mother     Heart disease Father     Melanoma Neg Hx     Breast cancer Neg Hx     Colon cancer Neg Hx     Ovarian cancer Neg Hx      Social History     Socioeconomic History    Marital status:     Number of children: 2   Tobacco Use    Smoking status: Former Smoker    Smokeless tobacco: Never Used    Tobacco comment: light smoker   Substance and Sexual Activity    Alcohol use: Yes      Comment: ocacsional    Drug use: No    Sexual activity: Yes     Partners: Male     Birth control/protection: None     Review of patient's allergies indicates:  No Known Allergies  Current Outpatient Medications on File Prior to Visit   Medication Sig Dispense Refill    ergocalciferol (ERGOCALCIFEROL) 50,000 unit Cap Take 1 capsule (50,000 Units total) by mouth every 7 days. 12 capsule 3    tacrolimus (PROTOPIC) 0.1 % ointment Apply topically 2 (two) times daily. 60 g 3    triamcinolone acetonide 0.1% (KENALOG) 0.1 % cream APPLY TWICE A DAY TO RASH ON RIGHT LEG 30 g 1     No current facility-administered medications on file prior to visit.           Objective:       Vitals:    01/10/22 1444   BP: 102/72   Pulse: 77   Temp: 98.5 °F (36.9 °C)       Physical Exam  Constitutional:       General: She is not in acute distress.     Appearance: She is well-developed. She is not diaphoretic.   HENT:      Head: Normocephalic and atraumatic.      Right Ear: External ear normal.      Left Ear: External ear normal.      Nose: Nose normal.      Mouth/Throat:      Pharynx: No oropharyngeal exudate.   Eyes:      General: No scleral icterus.        Right eye: No discharge.         Left eye: No discharge.      Conjunctiva/sclera: Conjunctivae normal.      Pupils: Pupils are equal, round, and reactive to light.   Neck:      Thyroid: No thyromegaly.      Vascular: No JVD.      Trachea: No tracheal deviation.   Cardiovascular:      Rate and Rhythm: Normal rate and regular rhythm.      Heart sounds: Normal heart sounds. No murmur heard.  No friction rub. No gallop.    Pulmonary:      Effort: Pulmonary effort is normal.      Breath sounds: Normal breath sounds. No stridor. No wheezing or rales.   Chest:      Chest wall: No tenderness.   Abdominal:      General: Bowel sounds are normal. There is no distension.      Palpations: Abdomen is soft. There is no mass.      Tenderness: There is no abdominal tenderness. There is no guarding or  rebound.      Hernia: No hernia is present.   Musculoskeletal:         General: Tenderness (mild TTp B/L calves) present. Normal range of motion.      Cervical back: Normal range of motion and neck supple.   Lymphadenopathy:      Cervical: No cervical adenopathy.   Skin:     General: Skin is warm and dry.      Coloration: Skin is not pale.      Findings: No erythema or rash.   Neurological:      Mental Status: She is alert and oriented to person, place, and time.      Cranial Nerves: No cranial nerve deficit.      Motor: No abnormal muscle tone.      Coordination: Coordination normal.      Deep Tendon Reflexes: Reflexes are normal and symmetric. Reflexes normal.   Psychiatric:         Behavior: Behavior normal.         Thought Content: Thought content normal.         Judgment: Judgment normal.         Assessment:       Problem List Items Addressed This Visit     Vitamin D deficiency    Relevant Orders    CBC Auto Differential    Comprehensive Metabolic Panel    Lipid Panel    CALCITRIOL    Hemoglobin A1C    Iron deficiency anemia    Relevant Orders    CBC Auto Differential    Comprehensive Metabolic Panel    Lipid Panel    CALCITRIOL    Hemoglobin A1C    BMI 25.0-25.9,adult    Relevant Orders    CBC Auto Differential    Comprehensive Metabolic Panel    Lipid Panel    CALCITRIOL    Hemoglobin A1C      Other Visit Diagnoses     Routine general medical examination at health care facility    -  Primary    Relevant Orders    CBC Auto Differential    Comprehensive Metabolic Panel    Lipid Panel    CALCITRIOL    Hemoglobin A1C    D dimer, quantitative    Pain of lower extremity, unspecified laterality        Relevant Orders    D dimer, quantitative          Plan:           Meri was seen today for annual exam.    Diagnoses and all orders for this visit:    Routine general medical examination at [facility] care facility  -     CBC Auto Differential; Future  -     Comprehensive Metabolic Panel; Future  -     Lipid Panel;  Future  -     CALCITRIOL; Future  -     Hemoglobin A1C; Future  -     D dimer, quantitative; Future    Iron deficiency anemia, unspecified iron deficiency anemia type  -     CBC Auto Differential; Future  -     Comprehensive Metabolic Panel; Future  -     Lipid Panel; Future  -     CALCITRIOL; Future  -     Hemoglobin A1C; Future    Vitamin D deficiency  -     CBC Auto Differential; Future  -     Comprehensive Metabolic Panel; Future  -     Lipid Panel; Future  -     CALCITRIOL; Future  -     Hemoglobin A1C; Future    BMI 25.0-25.9,adult  -     CBC Auto Differential; Future  -     Comprehensive Metabolic Panel; Future  -     Lipid Panel; Future  -     CALCITRIOL; Future  -     Hemoglobin A1C; Future    Pain of lower extremity, unspecified laterality  -     D dimer, quantitative; Future      Wellness check  -normal exam  -labs      Anemia  -lab repeat  -continue iron    Vit D def  -replacement OTC  -bone density      Leg pain  -d dimer        Spent adequate time in obtaining history and explaining differentials      Follow up in about 1 year (around 1/10/2023), or if symptoms worsen or fail to improve.

## 2022-01-11 ENCOUNTER — LAB VISIT (OUTPATIENT)
Dept: LAB | Facility: HOSPITAL | Age: 42
End: 2022-01-11
Attending: FAMILY MEDICINE
Payer: COMMERCIAL

## 2022-01-11 DIAGNOSIS — E55.9 VITAMIN D DEFICIENCY: ICD-10-CM

## 2022-01-11 DIAGNOSIS — Z00.00 ROUTINE GENERAL MEDICAL EXAMINATION AT HEALTH CARE FACILITY: ICD-10-CM

## 2022-01-11 DIAGNOSIS — M79.606 PAIN OF LOWER EXTREMITY, UNSPECIFIED LATERALITY: ICD-10-CM

## 2022-01-11 DIAGNOSIS — D50.9 IRON DEFICIENCY ANEMIA, UNSPECIFIED IRON DEFICIENCY ANEMIA TYPE: ICD-10-CM

## 2022-01-11 LAB
ALBUMIN SERPL BCP-MCNC: 3.8 G/DL (ref 3.5–5.2)
ALP SERPL-CCNC: 54 U/L (ref 55–135)
ALT SERPL W/O P-5'-P-CCNC: 11 U/L (ref 10–44)
ANION GAP SERPL CALC-SCNC: 6 MMOL/L (ref 8–16)
AST SERPL-CCNC: 11 U/L (ref 10–40)
BASOPHILS # BLD AUTO: 0.01 K/UL (ref 0–0.2)
BASOPHILS NFR BLD: 0.2 % (ref 0–1.9)
BILIRUB SERPL-MCNC: 0.7 MG/DL (ref 0.1–1)
BUN SERPL-MCNC: 12 MG/DL (ref 6–20)
CALCIUM SERPL-MCNC: 8.8 MG/DL (ref 8.7–10.5)
CHLORIDE SERPL-SCNC: 107 MMOL/L (ref 95–110)
CHOLEST SERPL-MCNC: 147 MG/DL (ref 120–199)
CHOLEST/HDLC SERPL: 3.9 {RATIO} (ref 2–5)
CO2 SERPL-SCNC: 26 MMOL/L (ref 23–29)
CREAT SERPL-MCNC: 0.8 MG/DL (ref 0.5–1.4)
D DIMER PPP IA.FEU-MCNC: 0.48 MG/L FEU
DIFFERENTIAL METHOD: ABNORMAL
EOSINOPHIL # BLD AUTO: 0.1 K/UL (ref 0–0.5)
EOSINOPHIL NFR BLD: 1.9 % (ref 0–8)
ERYTHROCYTE [DISTWIDTH] IN BLOOD BY AUTOMATED COUNT: 15.2 % (ref 11.5–14.5)
EST. GFR  (AFRICAN AMERICAN): >60 ML/MIN/1.73 M^2
EST. GFR  (NON AFRICAN AMERICAN): >60 ML/MIN/1.73 M^2
ESTIMATED AVG GLUCOSE: 80 MG/DL (ref 68–131)
GLUCOSE SERPL-MCNC: 96 MG/DL (ref 70–110)
HBA1C MFR BLD: 4.4 % (ref 4–5.6)
HCT VFR BLD AUTO: 39.2 % (ref 37–48.5)
HDLC SERPL-MCNC: 38 MG/DL (ref 40–75)
HDLC SERPL: 25.9 % (ref 20–50)
HGB BLD-MCNC: 12.2 G/DL (ref 12–16)
IMM GRANULOCYTES # BLD AUTO: 0.01 K/UL (ref 0–0.04)
IMM GRANULOCYTES NFR BLD AUTO: 0.2 % (ref 0–0.5)
LDLC SERPL CALC-MCNC: 93.4 MG/DL (ref 63–159)
LYMPHOCYTES # BLD AUTO: 1.2 K/UL (ref 1–4.8)
LYMPHOCYTES NFR BLD: 28.3 % (ref 18–48)
MCH RBC QN AUTO: 27.6 PG (ref 27–31)
MCHC RBC AUTO-ENTMCNC: 31.1 G/DL (ref 32–36)
MCV RBC AUTO: 89 FL (ref 82–98)
MONOCYTES # BLD AUTO: 0.3 K/UL (ref 0.3–1)
MONOCYTES NFR BLD: 8 % (ref 4–15)
NEUTROPHILS # BLD AUTO: 2.6 K/UL (ref 1.8–7.7)
NEUTROPHILS NFR BLD: 61.4 % (ref 38–73)
NONHDLC SERPL-MCNC: 109 MG/DL
NRBC BLD-RTO: 0 /100 WBC
PLATELET # BLD AUTO: 224 K/UL (ref 150–450)
PMV BLD AUTO: 11.9 FL (ref 9.2–12.9)
POTASSIUM SERPL-SCNC: 3.8 MMOL/L (ref 3.5–5.1)
PROT SERPL-MCNC: 7.1 G/DL (ref 6–8.4)
RBC # BLD AUTO: 4.42 M/UL (ref 4–5.4)
SODIUM SERPL-SCNC: 139 MMOL/L (ref 136–145)
TRIGL SERPL-MCNC: 78 MG/DL (ref 30–150)
WBC # BLD AUTO: 4.24 K/UL (ref 3.9–12.7)

## 2022-01-11 PROCEDURE — 80061 LIPID PANEL: CPT | Performed by: FAMILY MEDICINE

## 2022-01-11 PROCEDURE — 36415 COLL VENOUS BLD VENIPUNCTURE: CPT | Performed by: FAMILY MEDICINE

## 2022-01-11 PROCEDURE — 85379 FIBRIN DEGRADATION QUANT: CPT | Performed by: FAMILY MEDICINE

## 2022-01-11 PROCEDURE — 85025 COMPLETE CBC W/AUTO DIFF WBC: CPT | Performed by: FAMILY MEDICINE

## 2022-01-11 PROCEDURE — 80053 COMPREHEN METABOLIC PANEL: CPT | Performed by: FAMILY MEDICINE

## 2022-01-11 PROCEDURE — 83036 HEMOGLOBIN GLYCOSYLATED A1C: CPT | Performed by: FAMILY MEDICINE

## 2022-01-11 PROCEDURE — 82652 VIT D 1 25-DIHYDROXY: CPT | Performed by: FAMILY MEDICINE

## 2022-01-12 LAB — 1,25(OH)2D3 SERPL-MCNC: 61 PG/ML (ref 20–79)

## 2022-02-08 ENCOUNTER — OFFICE VISIT (OUTPATIENT)
Dept: DERMATOLOGY | Facility: CLINIC | Age: 42
End: 2022-02-08
Payer: COMMERCIAL

## 2022-02-08 VITALS — WEIGHT: 185 LBS | BODY MASS INDEX: 28.98 KG/M2

## 2022-02-08 DIAGNOSIS — L28.2 PRURIGO: Primary | ICD-10-CM

## 2022-02-08 DIAGNOSIS — L72.0 MILIUM CYST: ICD-10-CM

## 2022-02-08 PROCEDURE — 11900 PR INJECTION INTO SKIN LESIONS, UP TO 7: ICD-10-PCS | Mod: S$GLB,,, | Performed by: DERMATOLOGY

## 2022-02-08 PROCEDURE — 99214 PR OFFICE/OUTPT VISIT, EST, LEVL IV, 30-39 MIN: ICD-10-PCS | Mod: 25,S$GLB,, | Performed by: DERMATOLOGY

## 2022-02-08 PROCEDURE — 11900 INJECT SKIN LESIONS </W 7: CPT | Mod: S$GLB,,, | Performed by: DERMATOLOGY

## 2022-02-08 PROCEDURE — 3044F PR MOST RECENT HEMOGLOBIN A1C LEVEL <7.0%: ICD-10-PCS | Mod: CPTII,S$GLB,, | Performed by: DERMATOLOGY

## 2022-02-08 PROCEDURE — 1159F MED LIST DOCD IN RCRD: CPT | Mod: CPTII,S$GLB,, | Performed by: DERMATOLOGY

## 2022-02-08 PROCEDURE — 99214 OFFICE O/P EST MOD 30 MIN: CPT | Mod: 25,S$GLB,, | Performed by: DERMATOLOGY

## 2022-02-08 PROCEDURE — 1160F PR REVIEW ALL MEDS BY PRESCRIBER/CLIN PHARMACIST DOCUMENTED: ICD-10-PCS | Mod: CPTII,S$GLB,, | Performed by: DERMATOLOGY

## 2022-02-08 PROCEDURE — 3008F PR BODY MASS INDEX (BMI) DOCUMENTED: ICD-10-PCS | Mod: CPTII,S$GLB,, | Performed by: DERMATOLOGY

## 2022-02-08 PROCEDURE — 99999 PR PBB SHADOW E&M-EST. PATIENT-LVL III: CPT | Mod: PBBFAC,,, | Performed by: DERMATOLOGY

## 2022-02-08 PROCEDURE — 1159F PR MEDICATION LIST DOCUMENTED IN MEDICAL RECORD: ICD-10-PCS | Mod: CPTII,S$GLB,, | Performed by: DERMATOLOGY

## 2022-02-08 PROCEDURE — 3008F BODY MASS INDEX DOCD: CPT | Mod: CPTII,S$GLB,, | Performed by: DERMATOLOGY

## 2022-02-08 PROCEDURE — 99999 PR PBB SHADOW E&M-EST. PATIENT-LVL III: ICD-10-PCS | Mod: PBBFAC,,, | Performed by: DERMATOLOGY

## 2022-02-08 PROCEDURE — 3044F HG A1C LEVEL LT 7.0%: CPT | Mod: CPTII,S$GLB,, | Performed by: DERMATOLOGY

## 2022-02-08 PROCEDURE — 1160F RVW MEDS BY RX/DR IN RCRD: CPT | Mod: CPTII,S$GLB,, | Performed by: DERMATOLOGY

## 2022-02-08 NOTE — PROGRESS NOTES
Subjective:       Patient ID:  Meri Cosme is a 41 y.o. female who presents for   Chief Complaint   Patient presents with    Follow-up     Flar/up on the left arm, several days     Follow up prurigo, improved. One lesion on left arm flared few others remain. Also lesion on left cheek       Review of Systems   Constitutional: Negative for fever, chills, weight loss, weight gain, fatigue, night sweats and malaise.   Skin: Negative for daily sunscreen use, activity-related sunscreen use and wears hat.   Hematologic/Lymphatic: Bruises/bleeds easily (bruises easily).        Objective:    Physical Exam   Constitutional: She appears well-developed and well-nourished.   Neurological: She is alert and oriented to person, place, and time.   Psychiatric: She has a normal mood and affect.   Skin:   Areas Examined (abnormalities noted in diagram):   Head / Face Inspection Performed  Neck Inspection Performed  RUE Inspected                       Diagram Legend     Erythematous scaling macule/papule c/w actinic keratosis       Vascular papule c/w angioma      Pigmented verrucoid papule/plaque c/w seborrheic keratosis      Yellow umbilicated papule c/w sebaceous hyperplasia      Irregularly shaped tan macule c/w lentigo     1-2 mm smooth white papules consistent with Milia      Movable subcutaneous cyst with punctum c/w epidermal inclusion cyst      Subcutaneous movable cyst c/w pilar cyst      Firm pink to brown papule c/w dermatofibroma      Pedunculated fleshy papule(s) c/w skin tag(s)      Evenly pigmented macule c/w junctional nevus     Mildly variegated pigmented, slightly irregular-bordered macule c/w mildly atypical nevus      Flesh colored to evenly pigmented papule c/w intradermal nevus       Pink pearly papule/plaque c/w basal cell carcinoma      Erythematous hyperkeratotic cursted plaque c/w SCC      Surgical scar with no sign of skin cancer recurrence      Open and closed comedones      Inflammatory papules and  pustules      Verrucoid papule consistent consistent with wart     Erythematous eczematous patches and plaques     Dystrophic onycholytic nail with subungual debris c/w onychomycosis     Umbilicated papule    Erythematous-base heme-crusted tan verrucoid plaque consistent with inflamed seborrheic keratosis     Erythematous Silvery Scaling Plaque c/w Psoriasis     See annotation      Assessment / Plan:        Prurigo  Intralesional Kenalog 5mg/cc (0.5 cc total) injected into 5 lesions . Patient tolerated procedure well.    Units: 1  NDC for Kenalog 10mg/cc:  4297-4400-08      Milium cyst  reassurance    Try differin gel hs             Follow up in about 2 months (around 4/8/2022).

## 2022-03-04 ENCOUNTER — TELEPHONE (OUTPATIENT)
Dept: DERMATOLOGY | Facility: CLINIC | Age: 42
End: 2022-03-04
Payer: COMMERCIAL

## 2022-03-04 NOTE — TELEPHONE ENCOUNTER
Spoke with pt and let her know that Mrs. Lobato and Dr. Spring are out of the office but will be returning next week. Pt verbalized understanding and thanked me for the call.

## 2022-03-24 ENCOUNTER — OFFICE VISIT (OUTPATIENT)
Dept: DERMATOLOGY | Facility: CLINIC | Age: 42
End: 2022-03-24
Payer: COMMERCIAL

## 2022-03-24 VITALS — WEIGHT: 185 LBS | BODY MASS INDEX: 28.98 KG/M2

## 2022-03-24 DIAGNOSIS — L28.2 PRURIGO: Primary | ICD-10-CM

## 2022-03-24 PROCEDURE — 1160F RVW MEDS BY RX/DR IN RCRD: CPT | Mod: CPTII,S$GLB,, | Performed by: DERMATOLOGY

## 2022-03-24 PROCEDURE — 1160F PR REVIEW ALL MEDS BY PRESCRIBER/CLIN PHARMACIST DOCUMENTED: ICD-10-PCS | Mod: CPTII,S$GLB,, | Performed by: DERMATOLOGY

## 2022-03-24 PROCEDURE — 1159F PR MEDICATION LIST DOCUMENTED IN MEDICAL RECORD: ICD-10-PCS | Mod: CPTII,S$GLB,, | Performed by: DERMATOLOGY

## 2022-03-24 PROCEDURE — 3008F BODY MASS INDEX DOCD: CPT | Mod: CPTII,S$GLB,, | Performed by: DERMATOLOGY

## 2022-03-24 PROCEDURE — 99999 PR PBB SHADOW E&M-EST. PATIENT-LVL III: CPT | Mod: PBBFAC,,, | Performed by: DERMATOLOGY

## 2022-03-24 PROCEDURE — 99214 PR OFFICE/OUTPT VISIT, EST, LEVL IV, 30-39 MIN: ICD-10-PCS | Mod: 25,S$GLB,, | Performed by: DERMATOLOGY

## 2022-03-24 PROCEDURE — 3044F HG A1C LEVEL LT 7.0%: CPT | Mod: CPTII,S$GLB,, | Performed by: DERMATOLOGY

## 2022-03-24 PROCEDURE — 3044F PR MOST RECENT HEMOGLOBIN A1C LEVEL <7.0%: ICD-10-PCS | Mod: CPTII,S$GLB,, | Performed by: DERMATOLOGY

## 2022-03-24 PROCEDURE — 99214 OFFICE O/P EST MOD 30 MIN: CPT | Mod: 25,S$GLB,, | Performed by: DERMATOLOGY

## 2022-03-24 PROCEDURE — 3008F PR BODY MASS INDEX (BMI) DOCUMENTED: ICD-10-PCS | Mod: CPTII,S$GLB,, | Performed by: DERMATOLOGY

## 2022-03-24 PROCEDURE — 11900 PR INJECTION INTO SKIN LESIONS, UP TO 7: ICD-10-PCS | Mod: S$GLB,,, | Performed by: DERMATOLOGY

## 2022-03-24 PROCEDURE — 1159F MED LIST DOCD IN RCRD: CPT | Mod: CPTII,S$GLB,, | Performed by: DERMATOLOGY

## 2022-03-24 PROCEDURE — 11900 INJECT SKIN LESIONS </W 7: CPT | Mod: S$GLB,,, | Performed by: DERMATOLOGY

## 2022-03-24 PROCEDURE — 99999 PR PBB SHADOW E&M-EST. PATIENT-LVL III: ICD-10-PCS | Mod: PBBFAC,,, | Performed by: DERMATOLOGY

## 2022-03-24 NOTE — PROGRESS NOTES
Subjective:       Patient ID:  Meri Cosme is a 41 y.o. female who presents for   Chief Complaint   Patient presents with    Follow-up     Kenalog injection for flare/up spots      Follow up for prurigo 4 lesions flared today.      Review of Systems   Constitutional: Negative for fever, chills, weight loss, weight gain, fatigue, night sweats and malaise.   Skin: Negative for daily sunscreen use, activity-related sunscreen use and wears hat.   Hematologic/Lymphatic: Bruises/bleeds easily.        Objective:    Physical Exam   Skin:   Areas Examined (abnormalities noted in diagram):   Neck Inspection Performed  LUE Inspection Performed  LLE Inspection Performed              Diagram Legend     Erythematous scaling macule/papule c/w actinic keratosis       Vascular papule c/w angioma      Pigmented verrucoid papule/plaque c/w seborrheic keratosis      Yellow umbilicated papule c/w sebaceous hyperplasia      Irregularly shaped tan macule c/w lentigo     1-2 mm smooth white papules consistent with Milia      Movable subcutaneous cyst with punctum c/w epidermal inclusion cyst      Subcutaneous movable cyst c/w pilar cyst      Firm pink to brown papule c/w dermatofibroma      Pedunculated fleshy papule(s) c/w skin tag(s)      Evenly pigmented macule c/w junctional nevus     Mildly variegated pigmented, slightly irregular-bordered macule c/w mildly atypical nevus      Flesh colored to evenly pigmented papule c/w intradermal nevus       Pink pearly papule/plaque c/w basal cell carcinoma      Erythematous hyperkeratotic cursted plaque c/w SCC      Surgical scar with no sign of skin cancer recurrence      Open and closed comedones      Inflammatory papules and pustules      Verrucoid papule consistent consistent with wart     Erythematous eczematous patches and plaques     Dystrophic onycholytic nail with subungual debris c/w onychomycosis     Umbilicated papule    Erythematous-base heme-crusted tan verrucoid plaque  consistent with inflamed seborrheic keratosis     Erythematous Silvery Scaling Plaque c/w Psoriasis     See annotation      Assessment / Plan:        Prurigo  Intralesional Kenalog 5mg/cc (0.5 cc total) injected into 4 lesions . Patient tolerated procedure well.    Units: 1  NDC for Kenalog 10mg/cc:  3566-0059-60               Follow up in about 4 weeks (around 4/21/2022).

## 2022-04-29 ENCOUNTER — PATIENT OUTREACH (OUTPATIENT)
Dept: ADMINISTRATIVE | Facility: OTHER | Age: 42
End: 2022-04-29
Payer: COMMERCIAL

## 2022-04-29 NOTE — PROGRESS NOTES
Health Maintenance Due   Topic Date Due    Hepatitis C Screening  Never done    HIV Screening  Never done    COVID-19 Vaccine (3 - Booster for Pfizer series) 12/20/2021     Updates were requested from care everywhere.  Chart was reviewed for overdue Proactive [facility] Encounters (ERNESTO) topics (CRS, Breast Cancer Screening, Eye exam)  Health Maintenance has been updated.  [facility] immunization registry triggered.  Immunizations were reconciled.

## 2022-05-17 ENCOUNTER — OFFICE VISIT (OUTPATIENT)
Dept: DERMATOLOGY | Facility: CLINIC | Age: 42
End: 2022-05-17
Payer: COMMERCIAL

## 2022-05-17 VITALS — BODY MASS INDEX: 28.98 KG/M2 | WEIGHT: 185 LBS

## 2022-05-17 DIAGNOSIS — L28.2 PRURIGO: Primary | ICD-10-CM

## 2022-05-17 PROCEDURE — 1159F MED LIST DOCD IN RCRD: CPT | Mod: CPTII,S$GLB,, | Performed by: DERMATOLOGY

## 2022-05-17 PROCEDURE — 99999 PR PBB SHADOW E&M-EST. PATIENT-LVL III: CPT | Mod: PBBFAC,,, | Performed by: DERMATOLOGY

## 2022-05-17 PROCEDURE — 1160F RVW MEDS BY RX/DR IN RCRD: CPT | Mod: CPTII,S$GLB,, | Performed by: DERMATOLOGY

## 2022-05-17 PROCEDURE — 1159F PR MEDICATION LIST DOCUMENTED IN MEDICAL RECORD: ICD-10-PCS | Mod: CPTII,S$GLB,, | Performed by: DERMATOLOGY

## 2022-05-17 PROCEDURE — 11900 PR INJECTION INTO SKIN LESIONS, UP TO 7: ICD-10-PCS | Mod: S$GLB,,, | Performed by: DERMATOLOGY

## 2022-05-17 PROCEDURE — 99999 PR PBB SHADOW E&M-EST. PATIENT-LVL III: ICD-10-PCS | Mod: PBBFAC,,, | Performed by: DERMATOLOGY

## 2022-05-17 PROCEDURE — 99214 OFFICE O/P EST MOD 30 MIN: CPT | Mod: 25,S$GLB,, | Performed by: DERMATOLOGY

## 2022-05-17 PROCEDURE — 11900 INJECT SKIN LESIONS </W 7: CPT | Mod: S$GLB,,, | Performed by: DERMATOLOGY

## 2022-05-17 PROCEDURE — 3008F BODY MASS INDEX DOCD: CPT | Mod: CPTII,S$GLB,, | Performed by: DERMATOLOGY

## 2022-05-17 PROCEDURE — 3044F HG A1C LEVEL LT 7.0%: CPT | Mod: CPTII,S$GLB,, | Performed by: DERMATOLOGY

## 2022-05-17 PROCEDURE — 3008F PR BODY MASS INDEX (BMI) DOCUMENTED: ICD-10-PCS | Mod: CPTII,S$GLB,, | Performed by: DERMATOLOGY

## 2022-05-17 PROCEDURE — 1160F PR REVIEW ALL MEDS BY PRESCRIBER/CLIN PHARMACIST DOCUMENTED: ICD-10-PCS | Mod: CPTII,S$GLB,, | Performed by: DERMATOLOGY

## 2022-05-17 PROCEDURE — 3044F PR MOST RECENT HEMOGLOBIN A1C LEVEL <7.0%: ICD-10-PCS | Mod: CPTII,S$GLB,, | Performed by: DERMATOLOGY

## 2022-05-17 PROCEDURE — 99214 PR OFFICE/OUTPT VISIT, EST, LEVL IV, 30-39 MIN: ICD-10-PCS | Mod: 25,S$GLB,, | Performed by: DERMATOLOGY

## 2022-05-17 NOTE — PROGRESS NOTES
Subjective:       Patient ID:  Meri Cosme is a 41 y.o. female who presents for   Chief Complaint   Patient presents with    Follow-up     Kenalog injection     Follow up prurigo still with lesions on left arm and chin, new one on right lower leg.       Review of Systems   Constitutional: Negative for fever, chills, weight loss, weight gain, fatigue, night sweats and malaise.   Skin: Negative for daily sunscreen use, activity-related sunscreen use and wears hat.   Hematologic/Lymphatic: Does not bruise/bleed easily.        Objective:    Physical Exam   Constitutional: She appears well-developed and well-nourished.   Neurological: She is alert and oriented to person, place, and time.   Psychiatric: She has a normal mood and affect.   Skin:   Areas Examined (abnormalities noted in diagram):   Neck Inspection Performed  LUE Inspection Performed  RLE Inspected              Diagram Legend     Erythematous scaling macule/papule c/w actinic keratosis       Vascular papule c/w angioma      Pigmented verrucoid papule/plaque c/w seborrheic keratosis      Yellow umbilicated papule c/w sebaceous hyperplasia      Irregularly shaped tan macule c/w lentigo     1-2 mm smooth white papules consistent with Milia      Movable subcutaneous cyst with punctum c/w epidermal inclusion cyst      Subcutaneous movable cyst c/w pilar cyst      Firm pink to brown papule c/w dermatofibroma      Pedunculated fleshy papule(s) c/w skin tag(s)      Evenly pigmented macule c/w junctional nevus     Mildly variegated pigmented, slightly irregular-bordered macule c/w mildly atypical nevus      Flesh colored to evenly pigmented papule c/w intradermal nevus       Pink pearly papule/plaque c/w basal cell carcinoma      Erythematous hyperkeratotic cursted plaque c/w SCC      Surgical scar with no sign of skin cancer recurrence      Open and closed comedones      Inflammatory papules and pustules      Verrucoid papule consistent consistent with wart      Erythematous eczematous patches and plaques     Dystrophic onycholytic nail with subungual debris c/w onychomycosis     Umbilicated papule    Erythematous-base heme-crusted tan verrucoid plaque consistent with inflamed seborrheic keratosis     Erythematous Silvery Scaling Plaque c/w Psoriasis     See annotation      Assessment / Plan:        Prurigo  Intralesional Kenalog 5mg/cc (0.4 cc total) injected into 4 lesions Patient tolerated procedure well.    Units: 1  NDC for Kenalog 10mg/cc:  6526-6988-14               Follow up in about 4 weeks (around 6/14/2022).

## 2022-05-19 ENCOUNTER — PATIENT MESSAGE (OUTPATIENT)
Dept: OBSTETRICS AND GYNECOLOGY | Facility: CLINIC | Age: 42
End: 2022-05-19
Payer: COMMERCIAL

## 2022-06-21 RX ORDER — TRIAMCINOLONE ACETONIDE 1 MG/G
CREAM TOPICAL
Qty: 30 G | Refills: 1 | Status: SHIPPED | OUTPATIENT
Start: 2022-06-21

## 2022-06-22 ENCOUNTER — OFFICE VISIT (OUTPATIENT)
Dept: DERMATOLOGY | Facility: CLINIC | Age: 42
End: 2022-06-22
Payer: COMMERCIAL

## 2022-06-22 VITALS — WEIGHT: 185 LBS | BODY MASS INDEX: 28.98 KG/M2

## 2022-06-22 DIAGNOSIS — L30.9 DERMATITIS: Primary | ICD-10-CM

## 2022-06-22 DIAGNOSIS — L28.2 PRURIGO: ICD-10-CM

## 2022-06-22 PROCEDURE — 3008F PR BODY MASS INDEX (BMI) DOCUMENTED: ICD-10-PCS | Mod: CPTII,S$GLB,, | Performed by: DERMATOLOGY

## 2022-06-22 PROCEDURE — 99214 OFFICE O/P EST MOD 30 MIN: CPT | Mod: 25,S$GLB,, | Performed by: DERMATOLOGY

## 2022-06-22 PROCEDURE — 1159F PR MEDICATION LIST DOCUMENTED IN MEDICAL RECORD: ICD-10-PCS | Mod: CPTII,S$GLB,, | Performed by: DERMATOLOGY

## 2022-06-22 PROCEDURE — 99999 PR PBB SHADOW E&M-EST. PATIENT-LVL II: CPT | Mod: PBBFAC,,, | Performed by: DERMATOLOGY

## 2022-06-22 PROCEDURE — 1160F PR REVIEW ALL MEDS BY PRESCRIBER/CLIN PHARMACIST DOCUMENTED: ICD-10-PCS | Mod: CPTII,S$GLB,, | Performed by: DERMATOLOGY

## 2022-06-22 PROCEDURE — 3044F HG A1C LEVEL LT 7.0%: CPT | Mod: CPTII,S$GLB,, | Performed by: DERMATOLOGY

## 2022-06-22 PROCEDURE — 3008F BODY MASS INDEX DOCD: CPT | Mod: CPTII,S$GLB,, | Performed by: DERMATOLOGY

## 2022-06-22 PROCEDURE — 99214 PR OFFICE/OUTPT VISIT, EST, LEVL IV, 30-39 MIN: ICD-10-PCS | Mod: 25,S$GLB,, | Performed by: DERMATOLOGY

## 2022-06-22 PROCEDURE — 1160F RVW MEDS BY RX/DR IN RCRD: CPT | Mod: CPTII,S$GLB,, | Performed by: DERMATOLOGY

## 2022-06-22 PROCEDURE — 1159F MED LIST DOCD IN RCRD: CPT | Mod: CPTII,S$GLB,, | Performed by: DERMATOLOGY

## 2022-06-22 PROCEDURE — 3044F PR MOST RECENT HEMOGLOBIN A1C LEVEL <7.0%: ICD-10-PCS | Mod: CPTII,S$GLB,, | Performed by: DERMATOLOGY

## 2022-06-22 PROCEDURE — 99999 PR PBB SHADOW E&M-EST. PATIENT-LVL II: ICD-10-PCS | Mod: PBBFAC,,, | Performed by: DERMATOLOGY

## 2022-06-22 PROCEDURE — 11900 INJECT SKIN LESIONS </W 7: CPT | Mod: S$GLB,,, | Performed by: DERMATOLOGY

## 2022-06-22 PROCEDURE — 11900 PR INJECTION INTO SKIN LESIONS, UP TO 7: ICD-10-PCS | Mod: S$GLB,,, | Performed by: DERMATOLOGY

## 2022-06-22 NOTE — PROGRESS NOTES
Subjective:       Patient ID:  Meri Cosme is a 41 y.o. female who presents for   Chief Complaint   Patient presents with    Follow-up     Kenalog injection     The prurigo has recurred, stressed over real estate sales.  Also eczema on right lower leg has flared itching and she rubs it.       Review of Systems   Constitutional: Negative for fever, chills, weight loss, weight gain, fatigue, night sweats and malaise.   Skin: Positive for rash and wears hat. Negative for daily sunscreen use and activity-related sunscreen use.   Hematologic/Lymphatic: Bruises/bleeds easily.        Objective:    Physical Exam   Constitutional: She appears well-developed and well-nourished.   Neurological: She is alert and oriented to person, place, and time.   Psychiatric: She has a normal mood and affect.   Skin:   Areas Examined (abnormalities noted in diagram):   Neck Inspection Performed  LUE Inspection Performed  RLE Inspected              Diagram Legend     Erythematous scaling macule/papule c/w actinic keratosis       Vascular papule c/w angioma      Pigmented verrucoid papule/plaque c/w seborrheic keratosis      Yellow umbilicated papule c/w sebaceous hyperplasia      Irregularly shaped tan macule c/w lentigo     1-2 mm smooth white papules consistent with Milia      Movable subcutaneous cyst with punctum c/w epidermal inclusion cyst      Subcutaneous movable cyst c/w pilar cyst      Firm pink to brown papule c/w dermatofibroma      Pedunculated fleshy papule(s) c/w skin tag(s)      Evenly pigmented macule c/w junctional nevus     Mildly variegated pigmented, slightly irregular-bordered macule c/w mildly atypical nevus      Flesh colored to evenly pigmented papule c/w intradermal nevus       Pink pearly papule/plaque c/w basal cell carcinoma      Erythematous hyperkeratotic cursted plaque c/w SCC      Surgical scar with no sign of skin cancer recurrence      Open and closed comedones      Inflammatory papules and pustules       Verrucoid papule consistent consistent with wart     Erythematous eczematous patches and plaques     Dystrophic onycholytic nail with subungual debris c/w onychomycosis     Umbilicated papule    Erythematous-base heme-crusted tan verrucoid plaque consistent with inflamed seborrheic keratosis     Erythematous Silvery Scaling Plaque c/w Psoriasis     See annotation      Assessment / Plan:        Dermatitis right shin  Refilled tac cream  Use dermeleve otc for pruritus  Dc rubbing area    Prurigo  Intralesional Kenalog 5mg/cc (0.7 cc total) injected into 6 lesions on the legs, left arm and chin  today after obtaining verbal consent including risk of surrounding hypopigmentation. Patient tolerated procedure well.    Units: 1  NDC for Kenalog 10mg/cc:  4664-4323-60               Follow up in about 4 weeks (around 7/20/2022).

## 2022-09-07 ENCOUNTER — TELEPHONE (OUTPATIENT)
Dept: OBSTETRICS AND GYNECOLOGY | Facility: CLINIC | Age: 42
End: 2022-09-07

## 2022-09-08 NOTE — TELEPHONE ENCOUNTER
----- Message from Johanny Crocker sent at 9/7/2022 11:33 AM CDT -----  Pt would like mammogram but needs orders to be put in the system.

## 2022-09-23 ENCOUNTER — OFFICE VISIT (OUTPATIENT)
Dept: OBSTETRICS AND GYNECOLOGY | Facility: CLINIC | Age: 42
End: 2022-09-23
Payer: COMMERCIAL

## 2022-09-23 ENCOUNTER — HOSPITAL ENCOUNTER (OUTPATIENT)
Dept: RADIOLOGY | Facility: HOSPITAL | Age: 42
Discharge: HOME | End: 2022-09-23
Attending: OBSTETRICS & GYNECOLOGY
Payer: COMMERCIAL

## 2022-09-23 VITALS
WEIGHT: 209 LBS | HEIGHT: 67 IN | SYSTOLIC BLOOD PRESSURE: 121 MMHG | BODY MASS INDEX: 32.8 KG/M2 | DIASTOLIC BLOOD PRESSURE: 70 MMHG

## 2022-09-23 DIAGNOSIS — Z01.419 WELL WOMAN EXAM WITH ROUTINE GYNECOLOGICAL EXAM: Primary | ICD-10-CM

## 2022-09-23 DIAGNOSIS — Z12.4 ROUTINE CERVICAL SMEAR: ICD-10-CM

## 2022-09-23 DIAGNOSIS — Z12.31 ENCOUNTER FOR SCREENING MAMMOGRAM FOR MALIGNANT NEOPLASM OF BREAST: ICD-10-CM

## 2022-09-23 PROCEDURE — 3008F BODY MASS INDEX DOCD: CPT | Mod: CPTII,S$GLB,, | Performed by: OBSTETRICS & GYNECOLOGY

## 2022-09-23 PROCEDURE — 1159F MED LIST DOCD IN RCRD: CPT | Mod: CPTII,S$GLB,, | Performed by: OBSTETRICS & GYNECOLOGY

## 2022-09-23 PROCEDURE — 3044F PR MOST RECENT HEMOGLOBIN A1C LEVEL <7.0%: ICD-10-PCS | Mod: CPTII,S$GLB,, | Performed by: OBSTETRICS & GYNECOLOGY

## 2022-09-23 PROCEDURE — 77063 MAMMO DIGITAL SCREENING BILAT WITH TOMO: ICD-10-PCS | Mod: 26,,, | Performed by: RADIOLOGY

## 2022-09-23 PROCEDURE — 77063 BREAST TOMOSYNTHESIS BI: CPT | Mod: 26,,, | Performed by: RADIOLOGY

## 2022-09-23 PROCEDURE — 99999 PR PBB SHADOW E&M-EST. PATIENT-LVL III: CPT | Mod: PBBFAC,,, | Performed by: OBSTETRICS & GYNECOLOGY

## 2022-09-23 PROCEDURE — 88175 CYTOPATH C/V AUTO FLUID REDO: CPT | Performed by: OBSTETRICS & GYNECOLOGY

## 2022-09-23 PROCEDURE — 3008F PR BODY MASS INDEX (BMI) DOCUMENTED: ICD-10-PCS | Mod: CPTII,S$GLB,, | Performed by: OBSTETRICS & GYNECOLOGY

## 2022-09-23 PROCEDURE — 77067 SCR MAMMO BI INCL CAD: CPT | Mod: TC

## 2022-09-23 PROCEDURE — 77063 BREAST TOMOSYNTHESIS BI: CPT | Mod: TC

## 2022-09-23 PROCEDURE — 3074F SYST BP LT 130 MM HG: CPT | Mod: CPTII,S$GLB,, | Performed by: OBSTETRICS & GYNECOLOGY

## 2022-09-23 PROCEDURE — 3078F PR MOST RECENT DIASTOLIC BLOOD PRESSURE < 80 MM HG: ICD-10-PCS | Mod: CPTII,S$GLB,, | Performed by: OBSTETRICS & GYNECOLOGY

## 2022-09-23 PROCEDURE — 99396 PR PREVENTIVE VISIT,EST,40-64: ICD-10-PCS | Mod: S$GLB,,, | Performed by: OBSTETRICS & GYNECOLOGY

## 2022-09-23 PROCEDURE — 3074F PR MOST RECENT SYSTOLIC BLOOD PRESSURE < 130 MM HG: ICD-10-PCS | Mod: CPTII,S$GLB,, | Performed by: OBSTETRICS & GYNECOLOGY

## 2022-09-23 PROCEDURE — 87624 HPV HI-RISK TYP POOLED RSLT: CPT | Performed by: OBSTETRICS & GYNECOLOGY

## 2022-09-23 PROCEDURE — 1160F RVW MEDS BY RX/DR IN RCRD: CPT | Mod: CPTII,S$GLB,, | Performed by: OBSTETRICS & GYNECOLOGY

## 2022-09-23 PROCEDURE — 3044F HG A1C LEVEL LT 7.0%: CPT | Mod: CPTII,S$GLB,, | Performed by: OBSTETRICS & GYNECOLOGY

## 2022-09-23 PROCEDURE — 77067 SCR MAMMO BI INCL CAD: CPT | Mod: 26,,, | Performed by: RADIOLOGY

## 2022-09-23 PROCEDURE — 1159F PR MEDICATION LIST DOCUMENTED IN MEDICAL RECORD: ICD-10-PCS | Mod: CPTII,S$GLB,, | Performed by: OBSTETRICS & GYNECOLOGY

## 2022-09-23 PROCEDURE — 1160F PR REVIEW ALL MEDS BY PRESCRIBER/CLIN PHARMACIST DOCUMENTED: ICD-10-PCS | Mod: CPTII,S$GLB,, | Performed by: OBSTETRICS & GYNECOLOGY

## 2022-09-23 PROCEDURE — 77067 MAMMO DIGITAL SCREENING BILAT WITH TOMO: ICD-10-PCS | Mod: 26,,, | Performed by: RADIOLOGY

## 2022-09-23 PROCEDURE — 99999 PR PBB SHADOW E&M-EST. PATIENT-LVL III: ICD-10-PCS | Mod: PBBFAC,,, | Performed by: OBSTETRICS & GYNECOLOGY

## 2022-09-23 PROCEDURE — 99396 PREV VISIT EST AGE 40-64: CPT | Mod: S$GLB,,, | Performed by: OBSTETRICS & GYNECOLOGY

## 2022-09-23 PROCEDURE — 3078F DIAST BP <80 MM HG: CPT | Mod: CPTII,S$GLB,, | Performed by: OBSTETRICS & GYNECOLOGY

## 2022-09-23 RX ORDER — CLOBETASOL PROPIONATE 0.5 MG/G
AEROSOL, FOAM TOPICAL
COMMUNITY
Start: 2022-09-22

## 2022-09-23 NOTE — PROGRESS NOTES
OBSTETRIC HISTORY:   OB History          5    Para   2    Term   2            AB   1    Living   2         SAB   0    IAB   1    Ectopic        Multiple        Live Births   2                COMPREHENSIVE GYN HISTORY:  PAP History: Denies abnormal Paps.  Infection History: Denies STDs. Denies PID.  Benign History: Denies uterine fibroids. Denies ovarian cysts. Denies endometriosis.   Cancer History: Denies cervical cancer. Denies uterine cancer or hyperplasia. Denies ovarian cancer. Denies vulvar cancer or pre-cancer. Denies vaginal cancer or pre-cancer. Denies breast cancer. Denies colon cancer.  Sexual Activity History:   reports that she currently engages in sexual activity and has had male partners. She reports using the following method of birth control/protection: None.   Menstrual History: Every 21 days, flows for 5-7 days. Normal flow.  Dysmenorrhea History: Reports occ. dysmenorrhea.  Contraception: None           HPI:   41 y.o.  Patient's last menstrual period was 2022.   Patient is  here for her annual gynecologic exam.    She has mentioned some concerns, in summary:  Inability to lose weight--discussed this is common as we age. Gaining of belly fat/weight is a natural aging phenomenon not a hormone issue typically. As we age we do not utilize glucose as well and our insulin and glucose do not function as well as they use to. The best diet types are some form of high protein/low carb such as Ideal protein, Atkins, Mediterranean Diet, ezzai - how to [place] etc. Requesting to check hormones to check for menopause yet she is having regular cycles. Discussed that this would not be helpful.      ROS:  GENERAL: Denies weight gain or weight loss. Feeling well overall.   SKIN: Denies rash or lesions.   HEAD: Denies headache.   NODES: Denies enlarged lymph nodes.   CHEST: Denies shortness of breath.   ABDOMEN: No abdominal pain, constipation, diarrhea, nausea, vomiting or rectal bleeding.   URINARY: No  "frequency, dysuria, hematuria, or burning on urination.  REPRODUCTIVE: See HPI.   BREASTS: The patient denies pain, lumps, or nipple discharge.   HEMATOLOGIC: No easy bruisability.   MUSCULOSKELETAL: Denies joint pain or back pain.   NEUROLOGIC: Denies weakness.   PSYCHIATRIC: Denies depression, anxiety or mood swings.    PE:   /70   Ht 5' 7" (1.702 m)   Wt 94.8 kg (208 lb 15.9 oz)   LMP 09/06/2022   BMI 32.73 kg/m²   APPEARANCE: Well nourished, well developed, in no acute distress.  NECK: Neck symmetric without  thyromegaly.  NODES: No inguinal, cervical lymph node enlargement.  CHEST: Lungs clear to auscultation.  HEART: Regular rate and rhythm, no murmurs, rubs or gallops.  ABDOMEN: Soft. No tenderness or masses. No hernias.  BREASTS: Symmetrical, no skin changes or visible lesions. No palpable masses, nipple discharge or adenopathy bilaterally.  PELVIC:   VULVA: No lesions. Normal female genitalia.  URETHRAL MEATUS: Normal size and location, no lesions, no prolapse.  URETHRA: No masses, tenderness, prolapse or scarring.  VAGINA: Moist and well rugated, no discharge, no significant cystocele or rectocele.  CERVIX: No lesions and discharge.  UTERUS: Normal size, regular shape, mobile, non-tender, bladder base nontender.  ADNEXA: No masses or tenderness.    PROCEDURES:  Pap smear  HRHPV    Assessment:  Normal Gynecologic Exam    Plan:  Mammogram and Colonoscopy if indicated by current recommendations.  Return to clinic in one year or for any problems or complaints.    Counseling:  Patient was counseled today on A.C.S. Pap guidelines and recommendations for yearly pelvic exams and monthly self breast exams; to see her PCP for other health maintenance. Regular exercise and healthy diet.      As of April 1, 2021, the Cures Act has been passed nationally. This new law requires that all doctors progress notes, lab results, pathology reports and radiology reports be released IMMEDIATELY to the patient in the " patient portal. That means that the results are released to you at the EXACT same time they are released to me. Therefore, with all of the patients that I have I am not able to reply to each patient exactly when the results come in. So there will be a delay from when you see the results to when I see them and have time to come up with a response to send you. Also I only see these results when I am on the computer at work. So if the results come in over the weekend or after 5 pm of a work day, I will not see them until the next business day. As you can tell, this is a challenge as a physician to give every patient the quick response they hope for and deserve. So please be patient!     Thanks for understanding,

## 2023-01-05 ENCOUNTER — TELEPHONE (OUTPATIENT)
Dept: FAMILY MEDICINE | Facility: CLINIC | Age: 43
End: 2023-01-05
Payer: COMMERCIAL

## 2023-01-05 DIAGNOSIS — Z00.00 ROUTINE GENERAL MEDICAL EXAMINATION AT HEALTH CARE FACILITY: Primary | ICD-10-CM

## 2023-01-05 DIAGNOSIS — E55.9 VITAMIN D DEFICIENCY: ICD-10-CM

## 2023-01-05 DIAGNOSIS — D50.9 IRON DEFICIENCY ANEMIA, UNSPECIFIED IRON DEFICIENCY ANEMIA TYPE: ICD-10-CM

## 2023-01-11 ENCOUNTER — LAB VISIT (OUTPATIENT)
Dept: LAB | Facility: HOSPITAL | Age: 43
End: 2023-01-11
Attending: FAMILY MEDICINE
Payer: COMMERCIAL

## 2023-01-11 ENCOUNTER — OFFICE VISIT (OUTPATIENT)
Dept: FAMILY MEDICINE | Facility: CLINIC | Age: 43
End: 2023-01-11
Attending: FAMILY MEDICINE
Payer: COMMERCIAL

## 2023-01-11 VITALS
DIASTOLIC BLOOD PRESSURE: 72 MMHG | OXYGEN SATURATION: 98 % | HEIGHT: 67 IN | BODY MASS INDEX: 32.65 KG/M2 | SYSTOLIC BLOOD PRESSURE: 110 MMHG | WEIGHT: 208 LBS | HEART RATE: 75 BPM

## 2023-01-11 DIAGNOSIS — D50.9 IRON DEFICIENCY ANEMIA, UNSPECIFIED IRON DEFICIENCY ANEMIA TYPE: ICD-10-CM

## 2023-01-11 DIAGNOSIS — Z00.00 ROUTINE GENERAL MEDICAL EXAMINATION AT HEALTH CARE FACILITY: Primary | ICD-10-CM

## 2023-01-11 DIAGNOSIS — E55.9 VITAMIN D DEFICIENCY: ICD-10-CM

## 2023-01-11 DIAGNOSIS — Z00.00 ROUTINE GENERAL MEDICAL EXAMINATION AT HEALTH CARE FACILITY: ICD-10-CM

## 2023-01-11 LAB
25(OH)D3+25(OH)D2 SERPL-MCNC: 15 NG/ML (ref 30–96)
ALBUMIN SERPL BCP-MCNC: 4.1 G/DL (ref 3.5–5.2)
ALP SERPL-CCNC: 57 U/L (ref 55–135)
ALT SERPL W/O P-5'-P-CCNC: 11 U/L (ref 10–44)
ANION GAP SERPL CALC-SCNC: 10 MMOL/L (ref 8–16)
AST SERPL-CCNC: 13 U/L (ref 10–40)
BASOPHILS # BLD AUTO: 0.02 K/UL (ref 0–0.2)
BASOPHILS NFR BLD: 0.6 % (ref 0–1.9)
BILIRUB SERPL-MCNC: 0.6 MG/DL (ref 0.1–1)
BUN SERPL-MCNC: 10 MG/DL (ref 6–20)
CALCIUM SERPL-MCNC: 9.2 MG/DL (ref 8.7–10.5)
CHLORIDE SERPL-SCNC: 107 MMOL/L (ref 95–110)
CHOLEST SERPL-MCNC: 188 MG/DL (ref 120–199)
CHOLEST/HDLC SERPL: 5.7 {RATIO} (ref 2–5)
CO2 SERPL-SCNC: 23 MMOL/L (ref 23–29)
CREAT SERPL-MCNC: 0.7 MG/DL (ref 0.5–1.4)
D DIMER PPP IA.FEU-MCNC: <0.19 MG/L FEU
DIFFERENTIAL METHOD: ABNORMAL
EOSINOPHIL # BLD AUTO: 0.1 K/UL (ref 0–0.5)
EOSINOPHIL NFR BLD: 1.7 % (ref 0–8)
ERYTHROCYTE [DISTWIDTH] IN BLOOD BY AUTOMATED COUNT: 16.2 % (ref 11.5–14.5)
EST. GFR  (NO RACE VARIABLE): >60 ML/MIN/1.73 M^2
FERRITIN SERPL-MCNC: 5 NG/ML (ref 20–300)
GLUCOSE SERPL-MCNC: 81 MG/DL (ref 70–110)
HCT VFR BLD AUTO: 34.9 % (ref 37–48.5)
HDLC SERPL-MCNC: 33 MG/DL (ref 40–75)
HDLC SERPL: 17.6 % (ref 20–50)
HGB BLD-MCNC: 10.7 G/DL (ref 12–16)
IMM GRANULOCYTES # BLD AUTO: 0.01 K/UL (ref 0–0.04)
IMM GRANULOCYTES NFR BLD AUTO: 0.3 % (ref 0–0.5)
IRON SERPL-MCNC: 21 UG/DL (ref 30–160)
LDLC SERPL CALC-MCNC: 140.8 MG/DL (ref 63–159)
LYMPHOCYTES # BLD AUTO: 1 K/UL (ref 1–4.8)
LYMPHOCYTES NFR BLD: 28.2 % (ref 18–48)
MCH RBC QN AUTO: 24.8 PG (ref 27–31)
MCHC RBC AUTO-ENTMCNC: 30.7 G/DL (ref 32–36)
MCV RBC AUTO: 81 FL (ref 82–98)
MONOCYTES # BLD AUTO: 0.3 K/UL (ref 0.3–1)
MONOCYTES NFR BLD: 9.7 % (ref 4–15)
NEUTROPHILS # BLD AUTO: 2.1 K/UL (ref 1.8–7.7)
NEUTROPHILS NFR BLD: 59.5 % (ref 38–73)
NONHDLC SERPL-MCNC: 155 MG/DL
NRBC BLD-RTO: 0 /100 WBC
PLATELET # BLD AUTO: 239 K/UL (ref 150–450)
PMV BLD AUTO: 11.8 FL (ref 9.2–12.9)
POTASSIUM SERPL-SCNC: 3.6 MMOL/L (ref 3.5–5.1)
PROT SERPL-MCNC: 7.2 G/DL (ref 6–8.4)
RBC # BLD AUTO: 4.32 M/UL (ref 4–5.4)
SATURATED IRON: 5 % (ref 20–50)
SODIUM SERPL-SCNC: 140 MMOL/L (ref 136–145)
TOTAL IRON BINDING CAPACITY: 440 UG/DL (ref 250–450)
TRANSFERRIN SERPL-MCNC: 297 MG/DL (ref 200–375)
TRIGL SERPL-MCNC: 71 MG/DL (ref 30–150)
WBC # BLD AUTO: 3.51 K/UL (ref 3.9–12.7)

## 2023-01-11 PROCEDURE — 1160F PR REVIEW ALL MEDS BY PRESCRIBER/CLIN PHARMACIST DOCUMENTED: ICD-10-PCS | Mod: CPTII,S$GLB,, | Performed by: FAMILY MEDICINE

## 2023-01-11 PROCEDURE — 36415 COLL VENOUS BLD VENIPUNCTURE: CPT | Performed by: FAMILY MEDICINE

## 2023-01-11 PROCEDURE — 3008F PR BODY MASS INDEX (BMI) DOCUMENTED: ICD-10-PCS | Mod: CPTII,S$GLB,, | Performed by: FAMILY MEDICINE

## 2023-01-11 PROCEDURE — 1159F MED LIST DOCD IN RCRD: CPT | Mod: CPTII,S$GLB,, | Performed by: FAMILY MEDICINE

## 2023-01-11 PROCEDURE — 3008F BODY MASS INDEX DOCD: CPT | Mod: CPTII,S$GLB,, | Performed by: FAMILY MEDICINE

## 2023-01-11 PROCEDURE — 83036 HEMOGLOBIN GLYCOSYLATED A1C: CPT | Performed by: FAMILY MEDICINE

## 2023-01-11 PROCEDURE — 99999 PR PBB SHADOW E&M-EST. PATIENT-LVL III: ICD-10-PCS | Mod: PBBFAC,,, | Performed by: FAMILY MEDICINE

## 2023-01-11 PROCEDURE — 80053 COMPREHEN METABOLIC PANEL: CPT | Performed by: FAMILY MEDICINE

## 2023-01-11 PROCEDURE — 80061 LIPID PANEL: CPT | Performed by: FAMILY MEDICINE

## 2023-01-11 PROCEDURE — 1160F RVW MEDS BY RX/DR IN RCRD: CPT | Mod: CPTII,S$GLB,, | Performed by: FAMILY MEDICINE

## 2023-01-11 PROCEDURE — 3078F PR MOST RECENT DIASTOLIC BLOOD PRESSURE < 80 MM HG: ICD-10-PCS | Mod: CPTII,S$GLB,, | Performed by: FAMILY MEDICINE

## 2023-01-11 PROCEDURE — 82306 VITAMIN D 25 HYDROXY: CPT | Performed by: FAMILY MEDICINE

## 2023-01-11 PROCEDURE — 85025 COMPLETE CBC W/AUTO DIFF WBC: CPT | Performed by: FAMILY MEDICINE

## 2023-01-11 PROCEDURE — 3074F PR MOST RECENT SYSTOLIC BLOOD PRESSURE < 130 MM HG: ICD-10-PCS | Mod: CPTII,S$GLB,, | Performed by: FAMILY MEDICINE

## 2023-01-11 PROCEDURE — 1159F PR MEDICATION LIST DOCUMENTED IN MEDICAL RECORD: ICD-10-PCS | Mod: CPTII,S$GLB,, | Performed by: FAMILY MEDICINE

## 2023-01-11 PROCEDURE — 82728 ASSAY OF FERRITIN: CPT | Performed by: FAMILY MEDICINE

## 2023-01-11 PROCEDURE — 84466 ASSAY OF TRANSFERRIN: CPT | Performed by: FAMILY MEDICINE

## 2023-01-11 PROCEDURE — 99396 PREV VISIT EST AGE 40-64: CPT | Mod: S$GLB,,, | Performed by: FAMILY MEDICINE

## 2023-01-11 PROCEDURE — 3078F DIAST BP <80 MM HG: CPT | Mod: CPTII,S$GLB,, | Performed by: FAMILY MEDICINE

## 2023-01-11 PROCEDURE — 99396 PR PREVENTIVE VISIT,EST,40-64: ICD-10-PCS | Mod: S$GLB,,, | Performed by: FAMILY MEDICINE

## 2023-01-11 PROCEDURE — 99999 PR PBB SHADOW E&M-EST. PATIENT-LVL III: CPT | Mod: PBBFAC,,, | Performed by: FAMILY MEDICINE

## 2023-01-11 PROCEDURE — 3074F SYST BP LT 130 MM HG: CPT | Mod: CPTII,S$GLB,, | Performed by: FAMILY MEDICINE

## 2023-01-11 PROCEDURE — 85379 FIBRIN DEGRADATION QUANT: CPT | Performed by: FAMILY MEDICINE

## 2023-01-11 NOTE — PROGRESS NOTES
Subjective:       Patient ID: Meri Cosme is a 42 y.o. female.    Chief Complaint: Annual Exam and Cough (Started this Morning)    42 yr old pleasant female with family h/o DM, anemia iron deficiency, and no other significant medical history presents today for 3 month check and lab work. Has some history of blood clots in family so she wanted to rule out.    Iron deficiency anemia - chronic - has heavy cycles - she had issues recently and needed transfusion - her H/H is table and improving now - asymptomatic       She exercises regularly and eats healthy      History as below - reviewed in detail      HM  -declined vaccines  -labs due    Cough  Associated symptoms include myalgias. Pertinent negatives include no chest pain, fever, headaches, rhinorrhea, sore throat, shortness of breath, weight loss or wheezing. There is no history of environmental allergies.   Anemia  Presents for follow-up visit. There has been no abdominal pain, bruising/bleeding easily, confusion, fever, light-headedness, pallor, palpitations, pica or weight loss. Signs of blood loss that are not present include hematemesis, melena and vaginal bleeding. Past treatments include changes in diet and oral iron supplements. There is no history of alcohol abuse, cancer, chronic renal disease, dementia, heart failure, HIV/AIDS, inflammatory bowel disease, malabsorption, neuropathy, recent surgery or rheumatic disease. There is no past history of bone marrow exam, EGD or FOBT. Compliance with medications is %.   Review of Systems   Constitutional: Negative.  Negative for activity change, diaphoresis, fever, unexpected weight change and weight loss.   HENT: Negative.  Negative for nasal congestion, ear discharge, hearing loss, rhinorrhea, sore throat and voice change.    Eyes: Negative.  Negative for pain, discharge and visual disturbance.   Respiratory:  Positive for cough. Negative for chest tightness, shortness of breath and wheezing.     Cardiovascular: Negative.  Negative for chest pain and palpitations.   Gastrointestinal: Negative.  Negative for abdominal distention, abdominal pain, anal bleeding, constipation, hematemesis, melena and nausea.   Endocrine: Negative.  Negative for cold intolerance, polydipsia and polyuria.   Genitourinary: Negative.  Negative for decreased urine volume, difficulty urinating, dysuria, frequency, menstrual problem, vaginal bleeding and vaginal pain.   Musculoskeletal:  Positive for leg pain and myalgias. Negative for arthralgias and gait problem.   Integumentary:  Negative for color change, pallor and wound. Negative.   Allergic/Immunologic: Negative.  Negative for environmental allergies and immunocompromised state.   Neurological: Negative.  Negative for dizziness, tremors, seizures, speech difficulty, light-headedness and headaches.   Hematological: Negative.  Negative for adenopathy. Does not bruise/bleed easily.   Psychiatric/Behavioral: Negative.  Negative for agitation, confusion, decreased concentration, hallucinations, self-injury and suicidal ideas. The patient is not nervous/anxious.    Active Ambulatory Problems     Diagnosis Date Noted    History of syncope 2015    Chest pain, central 2015    BMI 25.0-25.9,adult 2015    Syncope and collapse 2015    Chest pain 2015    Pre-syncope 2015    Iron deficiency anemia 10/01/2019    Vitamin D deficiency 10/01/2019     Resolved Ambulatory Problems     Diagnosis Date Noted    Symptomatic anemia 2019     Past Medical History:   Diagnosis Date    Heart disease      Past Surgical History:   Procedure Laterality Date    BREAST RECONSTRUCTION Bilateral 2014    lift     SECTION       Family History   Problem Relation Age of Onset    Heart disease Mother     Heart disease Father     Melanoma Neg Hx     Breast cancer Neg Hx     Colon cancer Neg Hx     Ovarian cancer Neg Hx      Social History     Socioeconomic History     Marital status:     Number of children: 2   Tobacco Use    Smoking status: Former    Smokeless tobacco: Never    Tobacco comments:     light smoker   Substance and Sexual Activity    Alcohol use: Yes     Comment: ocacsional    Drug use: No    Sexual activity: Yes     Partners: Male     Birth control/protection: None     Review of patient's allergies indicates:  No Known Allergies  Current Outpatient Medications on File Prior to Visit   Medication Sig Dispense Refill    ergocalciferol (ERGOCALCIFEROL) 50,000 unit Cap Take 1 capsule (50,000 Units total) by mouth every 7 days. 12 capsule 3    tacrolimus (PROTOPIC) 0.1 % ointment Apply topically 2 (two) times daily. 60 g 3    TOVET EMOLLIENT 0.05 % topical foam Apply topically.      triamcinolone acetonide 0.1% (KENALOG) 0.1 % cream APPLY TWICE A DAY TO RASH ON RIGHT LEG 30 g 1     No current facility-administered medications on file prior to visit.           Objective:       Vitals:    01/11/23 1011   BP: 110/72   Pulse: 75       Physical Exam  Constitutional:       General: She is not in acute distress.     Appearance: She is well-developed. She is not diaphoretic.   HENT:      Head: Normocephalic and atraumatic.      Right Ear: External ear normal.      Left Ear: External ear normal.      Nose: Nose normal.      Mouth/Throat:      Pharynx: No oropharyngeal exudate.   Eyes:      General: No scleral icterus.        Right eye: No discharge.         Left eye: No discharge.      Conjunctiva/sclera: Conjunctivae normal.      Pupils: Pupils are equal, round, and reactive to light.   Neck:      Thyroid: No thyromegaly.      Vascular: No JVD.      Trachea: No tracheal deviation.   Cardiovascular:      Rate and Rhythm: Normal rate and regular rhythm.      Heart sounds: Normal heart sounds. No murmur heard.    No friction rub. No gallop.   Pulmonary:      Effort: Pulmonary effort is normal.      Breath sounds: Normal breath sounds. No stridor. No wheezing or rales.    Chest:      Chest wall: No tenderness.   Abdominal:      General: Bowel sounds are normal. There is no distension.      Palpations: Abdomen is soft. There is no mass.      Tenderness: There is no abdominal tenderness. There is no guarding or rebound.      Hernia: No hernia is present.   Musculoskeletal:         General: Tenderness (mild TTp B/L calves) present. Normal range of motion.      Cervical back: Normal range of motion and neck supple.   Lymphadenopathy:      Cervical: No cervical adenopathy.   Skin:     General: Skin is warm and dry.      Coloration: Skin is not pale.      Findings: No erythema or rash.   Neurological:      Mental Status: She is alert and oriented to person, place, and time.      Cranial Nerves: No cranial nerve deficit.      Motor: No abnormal muscle tone.      Coordination: Coordination normal.      Deep Tendon Reflexes: Reflexes are normal and symmetric. Reflexes normal.   Psychiatric:         Behavior: Behavior normal.         Thought Content: Thought content normal.         Judgment: Judgment normal.       Assessment:       Problem List Items Addressed This Visit       Vitamin D deficiency    Relevant Orders    Hemoglobin A1C    D dimer, quantitative    Iron deficiency anemia    Relevant Orders    Hemoglobin A1C    D dimer, quantitative     Other Visit Diagnoses       Routine general medical examination at health care facility    -  Primary    Relevant Orders    Hemoglobin A1C    D dimer, quantitative            Plan:           Meri was seen today for annual exam and cough.    Diagnoses and all orders for this visit:    Routine general medical examination at health care facility  -     Hemoglobin A1C; Future  -     D dimer, quantitative; Future    Vitamin D deficiency  -     Hemoglobin A1C; Future  -     D dimer, quantitative; Future    Iron deficiency anemia, unspecified iron deficiency anemia type  -     Hemoglobin A1C; Future  -     D dimer, quantitative; Future    Wellness  check  -normal exam  -labs      Anemia  -lab repeat  -continue iron    Vit D def  -replacement OTC  -bone density      Leg pain  -d dimer        Spent adequate time in obtaining history and explaining differentials      Follow up in about 1 year (around 1/11/2024), or if symptoms worsen or fail to improve.

## 2023-01-12 LAB
ESTIMATED AVG GLUCOSE: 77 MG/DL (ref 68–131)
HBA1C MFR BLD: 4.3 % (ref 4–5.6)

## 2023-01-13 ENCOUNTER — TELEPHONE (OUTPATIENT)
Dept: FAMILY MEDICINE | Facility: CLINIC | Age: 43
End: 2023-01-13
Payer: COMMERCIAL

## 2023-01-14 NOTE — TELEPHONE ENCOUNTER
----- Message from Jarodaleida Hernández sent at 1/13/2023 11:03 AM CST -----  Contact: pt  Type:  Patient Returning Call    Who Called:pt   Who Left Message for Patient:office   Does the patient know what this is regarding?:n/a  Would the patient rather a call back or a response via [facility]? call  Best Call Back Number:[phone]    Additional Information:

## 2023-01-26 ENCOUNTER — TELEPHONE (OUTPATIENT)
Dept: FAMILY MEDICINE | Facility: CLINIC | Age: 43
End: 2023-01-26
Payer: COMMERCIAL

## 2023-01-26 DIAGNOSIS — E88.810 METABOLIC SYNDROME: Primary | ICD-10-CM

## 2023-01-27 RX ORDER — TIRZEPATIDE 2.5 MG/.5ML
2.5 INJECTION, SOLUTION SUBCUTANEOUS
Qty: 4 PEN | Refills: 2 | Status: SHIPPED | OUTPATIENT
Start: 2023-01-27

## 2023-01-27 NOTE — TELEPHONE ENCOUNTER
----- Message from Romie Norris MA sent at 1/26/2023  4:39 PM CST -----    ----- Message -----  From: Finn Warren  Sent: 1/26/2023  12:24 PM CST  To: [facility] Cerv[facility] Staff    Type:  Patient Returning Call    Who Called:pt  Who Left Message for Patient:  Does the patient know what this is regarding?:questions  Would the patient rather a call back or a response via [facility]? Call  Best Call Back Number:[phone]  Additional Information: pt would like to see if she can do insulin resistance    test and diabetes injections.

## 2023-01-27 NOTE — TELEPHONE ENCOUNTER
Spoke to pt and requesting a call from Dr. Alston regarding if she can do insulin resistance test and Mounjour diabetes injections. Pls advise.

## 2023-01-31 ENCOUNTER — PATIENT MESSAGE (OUTPATIENT)
Dept: FAMILY MEDICINE | Facility: CLINIC | Age: 43
End: 2023-01-31
Payer: COMMERCIAL

## 2023-02-02 ENCOUNTER — LAB VISIT (OUTPATIENT)
Dept: LAB | Facility: HOSPITAL | Age: 43
End: 2023-02-02
Attending: FAMILY MEDICINE
Payer: COMMERCIAL

## 2023-02-02 ENCOUNTER — TELEPHONE (OUTPATIENT)
Dept: FAMILY MEDICINE | Facility: CLINIC | Age: 43
End: 2023-02-02
Payer: COMMERCIAL

## 2023-02-02 DIAGNOSIS — E88.810 METABOLIC SYNDROME: ICD-10-CM

## 2023-02-02 PROCEDURE — 83520 IMMUNOASSAY QUANT NOS NONAB: CPT | Performed by: FAMILY MEDICINE

## 2023-02-02 PROCEDURE — 36415 COLL VENOUS BLD VENIPUNCTURE: CPT | Performed by: FAMILY MEDICINE

## 2023-02-02 PROCEDURE — 86337 INSULIN ANTIBODIES: CPT | Performed by: FAMILY MEDICINE

## 2023-02-02 PROCEDURE — 84305 ASSAY OF SOMATOMEDIN: CPT | Performed by: FAMILY MEDICINE

## 2023-02-02 PROCEDURE — 86341 ISLET CELL ANTIBODY: CPT | Performed by: FAMILY MEDICINE

## 2023-02-03 NOTE — TELEPHONE ENCOUNTER
----- Message from Katy Gomez sent at 2/2/2023 11:34 AM CST -----  .Type:  Needs Medical Advice    Who Called: pt  Would the patient rather a call back or a response via [facility]? call  Best Call Back Number: [phone]  Additional Information:     Pt would like to know if she can schedule ordered test for today even though she has already had coffee

## 2023-02-03 NOTE — TELEPHONE ENCOUNTER
Discharge instructions and med list reviewed with pt. All questions answered. Pt has oxygen tank with him for discharge.    Patient had labs done today

## 2023-02-04 LAB — IGF BP3 SERPL-MCNC: 5.6 MCG/ML (ref 3.3–6.6)

## 2023-02-07 LAB
GAD65 AB SER-SCNC: 0 NMOL/L
IGF-I SERPL-MCNC: 125 NG/ML (ref 49–240)
IGF-I Z-SCORE SERPL: 0.13 SD
INSULIN AB SER-SCNC: 0 NMOL/L (ref 0–0.02)

## 2023-09-25 ENCOUNTER — HOSPITAL ENCOUNTER (OUTPATIENT)
Dept: RADIOLOGY | Facility: HOSPITAL | Age: 43
Discharge: HOME | End: 2023-09-25
Attending: OBSTETRICS & GYNECOLOGY
Payer: COMMERCIAL

## 2023-09-25 ENCOUNTER — OFFICE VISIT (OUTPATIENT)
Dept: OBSTETRICS AND GYNECOLOGY | Facility: CLINIC | Age: 43
End: 2023-09-25
Payer: COMMERCIAL

## 2023-09-25 VITALS
SYSTOLIC BLOOD PRESSURE: 109 MMHG | BODY MASS INDEX: 34.19 KG/M2 | HEIGHT: 67 IN | WEIGHT: 217.81 LBS | DIASTOLIC BLOOD PRESSURE: 75 MMHG

## 2023-09-25 DIAGNOSIS — Z12.31 ENCOUNTER FOR SCREENING MAMMOGRAM FOR MALIGNANT NEOPLASM OF BREAST: ICD-10-CM

## 2023-09-25 DIAGNOSIS — Z12.4 ROUTINE CERVICAL SMEAR: ICD-10-CM

## 2023-09-25 DIAGNOSIS — Z01.419 WELL WOMAN EXAM WITH ROUTINE GYNECOLOGICAL EXAM: Primary | ICD-10-CM

## 2023-09-25 PROCEDURE — 77063 MAMMO DIGITAL SCREENING BILAT WITH TOMO: ICD-10-PCS | Mod: 26,,, | Performed by: RADIOLOGY

## 2023-09-25 PROCEDURE — 77067 SCR MAMMO BI INCL CAD: CPT | Mod: TC

## 2023-09-25 PROCEDURE — 3074F PR MOST RECENT SYSTOLIC BLOOD PRESSURE < 130 MM HG: ICD-10-PCS | Mod: CPTII,S$GLB,, | Performed by: OBSTETRICS & GYNECOLOGY

## 2023-09-25 PROCEDURE — 99396 PR PREVENTIVE VISIT,EST,40-64: ICD-10-PCS | Mod: S$GLB,,, | Performed by: OBSTETRICS & GYNECOLOGY

## 2023-09-25 PROCEDURE — 99999 PR PBB SHADOW E&M-EST. PATIENT-LVL III: CPT | Mod: PBBFAC,,, | Performed by: OBSTETRICS & GYNECOLOGY

## 2023-09-25 PROCEDURE — 99999 PR PBB SHADOW E&M-EST. PATIENT-LVL III: ICD-10-PCS | Mod: PBBFAC,,, | Performed by: OBSTETRICS & GYNECOLOGY

## 2023-09-25 PROCEDURE — 87624 HPV HI-RISK TYP POOLED RSLT: CPT | Performed by: OBSTETRICS & GYNECOLOGY

## 2023-09-25 PROCEDURE — 77063 BREAST TOMOSYNTHESIS BI: CPT | Mod: 26,,, | Performed by: RADIOLOGY

## 2023-09-25 PROCEDURE — 3078F DIAST BP <80 MM HG: CPT | Mod: CPTII,S$GLB,, | Performed by: OBSTETRICS & GYNECOLOGY

## 2023-09-25 PROCEDURE — 1159F PR MEDICATION LIST DOCUMENTED IN MEDICAL RECORD: ICD-10-PCS | Mod: CPTII,S$GLB,, | Performed by: OBSTETRICS & GYNECOLOGY

## 2023-09-25 PROCEDURE — 3078F PR MOST RECENT DIASTOLIC BLOOD PRESSURE < 80 MM HG: ICD-10-PCS | Mod: CPTII,S$GLB,, | Performed by: OBSTETRICS & GYNECOLOGY

## 2023-09-25 PROCEDURE — 3044F PR MOST RECENT HEMOGLOBIN A1C LEVEL <7.0%: ICD-10-PCS | Mod: CPTII,S$GLB,, | Performed by: OBSTETRICS & GYNECOLOGY

## 2023-09-25 PROCEDURE — 3008F PR BODY MASS INDEX (BMI) DOCUMENTED: ICD-10-PCS | Mod: CPTII,S$GLB,, | Performed by: OBSTETRICS & GYNECOLOGY

## 2023-09-25 PROCEDURE — 3008F BODY MASS INDEX DOCD: CPT | Mod: CPTII,S$GLB,, | Performed by: OBSTETRICS & GYNECOLOGY

## 2023-09-25 PROCEDURE — 3074F SYST BP LT 130 MM HG: CPT | Mod: CPTII,S$GLB,, | Performed by: OBSTETRICS & GYNECOLOGY

## 2023-09-25 PROCEDURE — 99396 PREV VISIT EST AGE 40-64: CPT | Mod: S$GLB,,, | Performed by: OBSTETRICS & GYNECOLOGY

## 2023-09-25 PROCEDURE — 77067 SCR MAMMO BI INCL CAD: CPT | Mod: 26,,, | Performed by: RADIOLOGY

## 2023-09-25 PROCEDURE — 77067 MAMMO DIGITAL SCREENING BILAT WITH TOMO: ICD-10-PCS | Mod: 26,,, | Performed by: RADIOLOGY

## 2023-09-25 PROCEDURE — 3044F HG A1C LEVEL LT 7.0%: CPT | Mod: CPTII,S$GLB,, | Performed by: OBSTETRICS & GYNECOLOGY

## 2023-09-25 PROCEDURE — 1159F MED LIST DOCD IN RCRD: CPT | Mod: CPTII,S$GLB,, | Performed by: OBSTETRICS & GYNECOLOGY

## 2023-09-25 PROCEDURE — 88175 CYTOPATH C/V AUTO FLUID REDO: CPT | Performed by: OBSTETRICS & GYNECOLOGY

## 2023-09-25 RX ORDER — MUPIROCIN 20 MG/G
OINTMENT TOPICAL 3 TIMES DAILY
Qty: 30 G | Refills: 0 | Status: SHIPPED | OUTPATIENT
Start: 2023-09-25

## 2023-09-25 NOTE — PROGRESS NOTES
"OBSTETRIC HISTORY:   OB History          5    Para   2    Term   2            AB   1    Living   2         SAB   0    IAB   1    Ectopic        Multiple        Live Births   2                COMPREHENSIVE GYN HISTORY:  PAP History: Denies abnormal Paps.  Infection History: Denies STDs. Denies PID.  Benign History: Denies uterine fibroids. Denies ovarian cysts. Denies endometriosis.   Cancer History: Denies cervical cancer. Denies uterine cancer or hyperplasia. Denies ovarian cancer. Denies vulvar cancer or pre-cancer. Denies vaginal cancer or pre-cancer. Denies breast cancer. Denies colon cancer.  Sexual Activity History:   reports that she currently engages in sexual activity and has had male partners. She reports using the following method of birth control/protection: None.   Menstrual History: Every 21 days, flows for 5-7 days. Normal flow.  Dysmenorrhea History: Reports occ. dysmenorrhea.  Contraception: None     HPI:   42 y.o.  Patient's last menstrual period was 2023 (exact date).   Patient is  here for her annual gynecologic exam.  She has no GYN complaints except can't lose weight. She denies bladder, breast and bowel complaints.    ROS:  GENERAL: Denies weight gain or weight loss.   CHEST: Denies shortness of breath.   ABDOMEN: No abdominal pain, constipation, diarrhea, nausea, vomiting or rectal bleeding.   URINARY: No frequency, dysuria, hematuria, or burning on urination.  REPRODUCTIVE: See HPI.   BREASTS: The patient denies pain, lumps, or nipple discharge.   PSYCHIATRIC: Denies depression or anxiety.    PE:   /75   Ht 5' 7" (1.702 m)   Wt 98.8 kg (217 lb 13 oz)   LMP 2023 (Exact Date)   BMI 34.11 kg/m²   APPEARANCE: Well nourished, well developed, in no acute distress.  NECK: Neck symmetric without  thyromegaly.  NODES: No inguinal, cervical lymph node enlargement.  CHEST: Lungs clear to auscultation.  HEART: Regular rate and rhythm, no murmurs, rubs or " gallops.  ABDOMEN: Soft. No tenderness or masses. No hernias.  BREASTS: Symmetrical, open carbuncle on right breast that is healing. No palpable masses, nipple discharge or adenopathy bilaterally.  PELVIC:   VULVA: No lesions. Normal female genitalia.  URETHRAL MEATUS: Normal size and location, no lesions, no prolapse.  URETHRA: No masses, tenderness, prolapse or scarring.  VAGINA: Moist and well rugated, no discharge, no significant cystocele or rectocele.  CERVIX: No lesions and discharge.  UTERUS: Normal size, regular shape, mobile, non-tender, bladder base nontender.  ADNEXA: No masses or tenderness.    PROCEDURES:  Pap smear  HRHPV    Assessment:  Normal Gynecologic Exam  Weight loss--Gaining of belly fat/weight is a natural aging phenomenon not a hormone issue typically. As we age we do not utilize glucose as well and our insulin and glucose do not function as well as they use to. The best diet types are some form of high protein/low carb such as Ideal protein, Atkins, Mediterranean Diet, [place] etc.      Plan:  Mammogram and Colonoscopy if indicated by current recommendations.  Return to clinic in one year or for any problems or complaints.    Counseling:  Patient was counseled today on A.C.S. Pap guidelines and recommendations for yearly pelvic exams and monthly self breast exams; to see her PCP for other health maintenance. Regular exercise and healthy diet.     As of April 1, 2021, the Cures Act has been passed nationally. This new law requires that all doctors progress notes, lab results, pathology reports and radiology reports be released IMMEDIATELY to the patient in the patient portal. That means that the results are released to you at the EXACT same time they are released to me. Therefore, with all of the patients that I have I am not able to reply to each patient exactly when the results come in. So there will be a delay from when you see the results to when I see them and have time to come up  with a response to send you. Also I only see these results when I am on the computer at work. So if the results come in over the weekend or after 5 pm of a work day, I will not see them until the next business day. As you can tell, this is a challenge as a physician to give every patient the quick response they hope for and deserve. So please be patient!     Thanks for understanding,

## 2023-09-30 LAB
HPV HR 12 DNA SPEC QL NAA+PROBE: NEGATIVE
HPV16 AG SPEC QL: NEGATIVE
HPV18 DNA SPEC QL NAA+PROBE: NEGATIVE

## 2023-10-01 LAB
FINAL PATHOLOGIC DIAGNOSIS: NORMAL
Lab: NORMAL

## 2024-09-25 ENCOUNTER — OFFICE VISIT (OUTPATIENT)
Dept: OBSTETRICS AND GYNECOLOGY | Facility: CLINIC | Age: 44
End: 2024-09-25
Payer: COMMERCIAL

## 2024-09-25 VITALS
BODY MASS INDEX: 34.64 KG/M2 | WEIGHT: 221.19 LBS | DIASTOLIC BLOOD PRESSURE: 80 MMHG | SYSTOLIC BLOOD PRESSURE: 134 MMHG

## 2024-09-25 DIAGNOSIS — Z12.4 ROUTINE CERVICAL SMEAR: ICD-10-CM

## 2024-09-25 DIAGNOSIS — Z01.419 WELL WOMAN EXAM WITH ROUTINE GYNECOLOGICAL EXAM: Primary | ICD-10-CM

## 2024-09-25 DIAGNOSIS — Z12.31 ENCOUNTER FOR SCREENING MAMMOGRAM FOR MALIGNANT NEOPLASM OF BREAST: ICD-10-CM

## 2024-09-25 PROCEDURE — 87624 HPV HI-RISK TYP POOLED RSLT: CPT | Performed by: OBSTETRICS & GYNECOLOGY

## 2024-09-25 PROCEDURE — 3075F SYST BP GE 130 - 139MM HG: CPT | Mod: CPTII,S$GLB,, | Performed by: OBSTETRICS & GYNECOLOGY

## 2024-09-25 PROCEDURE — 1160F RVW MEDS BY RX/DR IN RCRD: CPT | Mod: CPTII,S$GLB,, | Performed by: OBSTETRICS & GYNECOLOGY

## 2024-09-25 PROCEDURE — 99999 PR PBB SHADOW E&M-EST. PATIENT-LVL III: CPT | Mod: PBBFAC,,, | Performed by: OBSTETRICS & GYNECOLOGY

## 2024-09-25 PROCEDURE — 3079F DIAST BP 80-89 MM HG: CPT | Mod: CPTII,S$GLB,, | Performed by: OBSTETRICS & GYNECOLOGY

## 2024-09-25 PROCEDURE — 88175 CYTOPATH C/V AUTO FLUID REDO: CPT | Performed by: OBSTETRICS & GYNECOLOGY

## 2024-09-25 PROCEDURE — 99396 PREV VISIT EST AGE 40-64: CPT | Mod: S$GLB,,, | Performed by: OBSTETRICS & GYNECOLOGY

## 2024-09-25 PROCEDURE — 3008F BODY MASS INDEX DOCD: CPT | Mod: CPTII,S$GLB,, | Performed by: OBSTETRICS & GYNECOLOGY

## 2024-09-25 PROCEDURE — 1159F MED LIST DOCD IN RCRD: CPT | Mod: CPTII,S$GLB,, | Performed by: OBSTETRICS & GYNECOLOGY

## 2024-09-25 RX ORDER — MOMETASONE FUROATE 1 MG/G
CREAM TOPICAL
COMMUNITY
Start: 2024-08-22

## 2024-09-25 NOTE — PROGRESS NOTES
OBSTETRIC HISTORY:   OB History          3    Para   2    Term   2            AB   1    Living   0         SAB   0    IAB   1    Ectopic        Multiple        Live Births   0                COMPREHENSIVE GYN HISTORY:  PAP History: Denies abnormal Paps.  Infection History: Denies STDs. Denies PID.  Benign History: Denies uterine fibroids. Denies ovarian cysts. Denies endometriosis.   Cancer History: Denies cervical cancer. Denies uterine cancer or hyperplasia. Denies ovarian cancer. Denies vulvar cancer or pre-cancer. Denies vaginal cancer or pre-cancer. Denies breast cancer. Denies colon cancer.  Sexual Activity History:   reports that she currently engages in sexual activity and has had male partners. She reports using the following method of birth control/protection: None.   Menstrual History: Every 21 days, flows for 5-7 days. Normal flow.  Dysmenorrhea History: Reports occ. dysmenorrhea.  Contraception: None     HPI:   43 y.o.  Patient's last menstrual period was 2024.   Patient is  here for her annual gynecologic exam.  Patient expressed frustration at not being put in by arrival time versus scheduled appointment time although others were on time. Had questions regarding a friend and fibrocysts but unsure if talking about fibrocystic breast disease or fibroids so did educate patient regarding the difference. Questions regarding weight gain and discussed usually at this age it usually is mid abdomen and wants to know if side effects of meds/pills (yes!! Side effects of all meds and surgeries). Discussed after exam can discuss more.      ROS:  GENERAL: No weight gain or weight loss.   CHEST: No shortness of breath.   ABDOMEN: No abdominal pain, constipation, diarrhea, nausea, vomiting or rectal bleeding.   URINARY: No frequency, dysuria, hematuria, or burning on urination.  REPRODUCTIVE: See HPI.   BREASTS: No breast pain, lumps, or nipple discharge.   PSYCHIATRIC: No depression or  anxiety.    PE:   /80   Wt 100.3 kg (221 lb 3.2 oz)   LMP 09/05/2024   BMI 34.64 kg/m²   APPEARANCE: Well nourished, well developed, in no acute distress.  NECK: Neck symmetric without  thyromegaly.  NODES: No inguinal, cervical lymph node enlargement.  CHEST: Lungs clear to auscultation.  HEART: Regular rate and rhythm, no murmurs, rubs or gallops.  ABDOMEN: Soft. No tenderness or masses. No hernias.  BREASTS: Symmetrical, well healed scars. No palpable masses, nipple discharge or adenopathy bilaterally.  PELVIC:   VULVA: No lesions. Normal female genitalia.  URETHRAL MEATUS: Normal size and location, no lesions, no prolapse.  URETHRA: No masses, tenderness, prolapse or scarring.  VAGINA: Moist and well rugated, no discharge, no significant cystocele or rectocele.  CERVIX: No lesions and discharge.  UTERUS: Normal size, regular shape, mobile, non-tender, bladder base nontender.  ADNEXA: No masses or tenderness.    PROCEDURES:  Pap smear  HRHPV    Assessment:  Normal Gynecologic Exam  Concerns about weight--when I returned to further discuss weight and review all PCP labs patient stated daughter had an emergency at school and she had to leave--briefly suggested Mediterranean diet or [place] diet    Recommendations routine screening and no risk factors:  Mammogram at age 40  Colonoscopy age 45  Bone density age 65  Return to clinic as needed for any problems.  Return to clinic in one year. It is recommended to have a physician breast exam and pelvic exam annually. This is different than doing a Pap smear.         As of April 1, 2021, the Cures Act has been passed nationally. This new law requires that all doctors progress notes, lab results, pathology reports and radiology reports be released IMMEDIATELY to the patient in the patient portal. That means that the results are released to you at the EXACT same time they are released to me. Therefore, with all of the patients that I have I am not able to reply  to each patient exactly when the results come in. So there will be a delay from when you see the results to when I see them and have time to come up with a response to send you. Also I only see these results when I am on the computer at work. So if the results come in over the weekend or after 5 pm of a work day, I will not see them until the next business day. As you can tell, this is a challenge as a physician to give every patient the quick response they hope for and deserve. So please be patient!     Thanks for understanding,

## 2024-09-26 LAB
CLINICAL INFO: NORMAL
DATE OF PREVIOUS PAP: NORMAL
DATE PREVIOUS BX: NO
LMP START DATE: NORMAL
SPECIMEN SOURCE CVX/VAG CYTO: NORMAL

## 2024-10-01 ENCOUNTER — HOSPITAL ENCOUNTER (OUTPATIENT)
Dept: RADIOLOGY | Facility: HOSPITAL | Age: 44
Discharge: HOME | End: 2024-10-01
Attending: OBSTETRICS & GYNECOLOGY
Payer: COMMERCIAL

## 2024-10-01 DIAGNOSIS — Z12.31 ENCOUNTER FOR SCREENING MAMMOGRAM FOR MALIGNANT NEOPLASM OF BREAST: ICD-10-CM

## 2024-10-01 PROCEDURE — 77067 SCR MAMMO BI INCL CAD: CPT | Mod: TC

## 2024-11-18 ENCOUNTER — OFFICE VISIT (OUTPATIENT)
Dept: FAMILY MEDICINE | Facility: CLINIC | Age: 44
End: 2024-11-18
Attending: FAMILY MEDICINE
Payer: COMMERCIAL

## 2024-11-18 ENCOUNTER — LAB VISIT (OUTPATIENT)
Dept: LAB | Facility: HOSPITAL | Age: 44
End: 2024-11-18
Attending: FAMILY MEDICINE
Payer: COMMERCIAL

## 2024-11-18 VITALS — HEART RATE: 86 BPM | SYSTOLIC BLOOD PRESSURE: 130 MMHG | DIASTOLIC BLOOD PRESSURE: 76 MMHG | OXYGEN SATURATION: 98 %

## 2024-11-18 DIAGNOSIS — E88.810 METABOLIC SYNDROME: ICD-10-CM

## 2024-11-18 DIAGNOSIS — E55.9 VITAMIN D DEFICIENCY: ICD-10-CM

## 2024-11-18 DIAGNOSIS — D50.9 IRON DEFICIENCY ANEMIA, UNSPECIFIED IRON DEFICIENCY ANEMIA TYPE: ICD-10-CM

## 2024-11-18 DIAGNOSIS — Z00.00 ROUTINE GENERAL MEDICAL EXAMINATION AT HEALTH CARE FACILITY: Primary | ICD-10-CM

## 2024-11-18 DIAGNOSIS — Z00.00 ROUTINE GENERAL MEDICAL EXAMINATION AT HEALTH CARE FACILITY: ICD-10-CM

## 2024-11-18 LAB
25(OH)D3+25(OH)D2 SERPL-MCNC: 25 NG/ML (ref 30–96)
ALBUMIN SERPL BCP-MCNC: 4.2 G/DL (ref 3.5–5.2)
ALP SERPL-CCNC: 80 U/L (ref 40–150)
ALT SERPL W/O P-5'-P-CCNC: 18 U/L (ref 10–44)
ANION GAP SERPL CALC-SCNC: 11 MMOL/L (ref 8–16)
AST SERPL-CCNC: 17 U/L (ref 10–40)
BASOPHILS # BLD AUTO: 0.02 K/UL (ref 0–0.2)
BASOPHILS NFR BLD: 0.5 % (ref 0–1.9)
BILIRUB SERPL-MCNC: 0.7 MG/DL (ref 0.1–1)
BUN SERPL-MCNC: 16 MG/DL (ref 6–20)
CALCIUM SERPL-MCNC: 9.5 MG/DL (ref 8.7–10.5)
CHLORIDE SERPL-SCNC: 107 MMOL/L (ref 95–110)
CHOLEST SERPL-MCNC: 187 MG/DL (ref 120–199)
CHOLEST/HDLC SERPL: 4.5 {RATIO} (ref 2–5)
CO2 SERPL-SCNC: 22 MMOL/L (ref 23–29)
CREAT SERPL-MCNC: 0.7 MG/DL (ref 0.5–1.4)
D DIMER PPP IA.FEU-MCNC: 0.31 MG/L FEU
DIFFERENTIAL METHOD BLD: ABNORMAL
EOSINOPHIL # BLD AUTO: 0 K/UL (ref 0–0.5)
EOSINOPHIL NFR BLD: 0.9 % (ref 0–8)
ERYTHROCYTE [DISTWIDTH] IN BLOOD BY AUTOMATED COUNT: 17.1 % (ref 11.5–14.5)
EST. GFR  (NO RACE VARIABLE): >60 ML/MIN/1.73 M^2
ESTIMATED AVG GLUCOSE: 88 MG/DL (ref 68–131)
FERRITIN SERPL-MCNC: 7 NG/ML (ref 20–300)
GLUCOSE SERPL-MCNC: 83 MG/DL (ref 70–110)
HBA1C MFR BLD: 4.7 % (ref 4–5.6)
HCT VFR BLD AUTO: 34.2 % (ref 37–48.5)
HDLC SERPL-MCNC: 42 MG/DL (ref 40–75)
HDLC SERPL: 22.5 % (ref 20–50)
HGB BLD-MCNC: 10.4 G/DL (ref 12–16)
IMM GRANULOCYTES # BLD AUTO: 0.01 K/UL (ref 0–0.04)
IMM GRANULOCYTES NFR BLD AUTO: 0.2 % (ref 0–0.5)
IRON SERPL-MCNC: 39 UG/DL (ref 30–160)
LDLC SERPL CALC-MCNC: 116.4 MG/DL (ref 63–159)
LYMPHOCYTES # BLD AUTO: 0.9 K/UL (ref 1–4.8)
LYMPHOCYTES NFR BLD: 20.1 % (ref 18–48)
MCH RBC QN AUTO: 23.1 PG (ref 27–31)
MCHC RBC AUTO-ENTMCNC: 30.4 G/DL (ref 32–36)
MCV RBC AUTO: 76 FL (ref 82–98)
MONOCYTES # BLD AUTO: 0.4 K/UL (ref 0.3–1)
MONOCYTES NFR BLD: 8.4 % (ref 4–15)
NEUTROPHILS # BLD AUTO: 3.1 K/UL (ref 1.8–7.7)
NEUTROPHILS NFR BLD: 69.9 % (ref 38–73)
NONHDLC SERPL-MCNC: 145 MG/DL
NRBC BLD-RTO: 0 /100 WBC
PLATELET # BLD AUTO: 279 K/UL (ref 150–450)
PMV BLD AUTO: 12.1 FL (ref 9.2–12.9)
POTASSIUM SERPL-SCNC: 3.7 MMOL/L (ref 3.5–5.1)
PROT SERPL-MCNC: 7.4 G/DL (ref 6–8.4)
RBC # BLD AUTO: 4.5 M/UL (ref 4–5.4)
SATURATED IRON: 7 % (ref 20–50)
SODIUM SERPL-SCNC: 140 MMOL/L (ref 136–145)
TOTAL IRON BINDING CAPACITY: 530 UG/DL (ref 250–450)
TRANSFERRIN SERPL-MCNC: 358 MG/DL (ref 200–375)
TRIGL SERPL-MCNC: 143 MG/DL (ref 30–150)
TSH SERPL DL<=0.005 MIU/L-ACNC: 1.83 UIU/ML (ref 0.4–4)
VIT B12 SERPL-MCNC: 282 PG/ML (ref 210–950)
WBC # BLD AUTO: 4.43 K/UL (ref 3.9–12.7)

## 2024-11-18 PROCEDURE — 3075F SYST BP GE 130 - 139MM HG: CPT | Mod: CPTII,S$GLB,, | Performed by: FAMILY MEDICINE

## 2024-11-18 PROCEDURE — 3078F DIAST BP <80 MM HG: CPT | Mod: CPTII,S$GLB,, | Performed by: FAMILY MEDICINE

## 2024-11-18 PROCEDURE — 84466 ASSAY OF TRANSFERRIN: CPT | Performed by: FAMILY MEDICINE

## 2024-11-18 PROCEDURE — 82607 VITAMIN B-12: CPT | Performed by: FAMILY MEDICINE

## 2024-11-18 PROCEDURE — 99999 PR PBB SHADOW E&M-EST. PATIENT-LVL IV: CPT | Mod: PBBFAC,,, | Performed by: FAMILY MEDICINE

## 2024-11-18 PROCEDURE — 36415 COLL VENOUS BLD VENIPUNCTURE: CPT | Performed by: FAMILY MEDICINE

## 2024-11-18 PROCEDURE — 85379 FIBRIN DEGRADATION QUANT: CPT | Performed by: FAMILY MEDICINE

## 2024-11-18 PROCEDURE — 1159F MED LIST DOCD IN RCRD: CPT | Mod: CPTII,S$GLB,, | Performed by: FAMILY MEDICINE

## 2024-11-18 PROCEDURE — 83036 HEMOGLOBIN GLYCOSYLATED A1C: CPT | Performed by: FAMILY MEDICINE

## 2024-11-18 PROCEDURE — 85025 COMPLETE CBC W/AUTO DIFF WBC: CPT | Performed by: FAMILY MEDICINE

## 2024-11-18 PROCEDURE — 82728 ASSAY OF FERRITIN: CPT | Performed by: FAMILY MEDICINE

## 2024-11-18 PROCEDURE — 82306 VITAMIN D 25 HYDROXY: CPT | Performed by: FAMILY MEDICINE

## 2024-11-18 PROCEDURE — 80061 LIPID PANEL: CPT | Performed by: FAMILY MEDICINE

## 2024-11-18 PROCEDURE — 84443 ASSAY THYROID STIM HORMONE: CPT | Performed by: FAMILY MEDICINE

## 2024-11-18 PROCEDURE — 80053 COMPREHEN METABOLIC PANEL: CPT | Performed by: FAMILY MEDICINE

## 2024-11-18 PROCEDURE — 99396 PREV VISIT EST AGE 40-64: CPT | Mod: S$GLB,,, | Performed by: FAMILY MEDICINE

## 2024-11-18 PROCEDURE — 1160F RVW MEDS BY RX/DR IN RCRD: CPT | Mod: CPTII,S$GLB,, | Performed by: FAMILY MEDICINE

## 2024-11-18 PROCEDURE — 3044F HG A1C LEVEL LT 7.0%: CPT | Mod: CPTII,S$GLB,, | Performed by: FAMILY MEDICINE

## 2024-11-18 NOTE — PROGRESS NOTES
Subjective:       Patient ID: Meri Cosme is a 44 y.o. female.    Chief Complaint: Annual Exam    43 yr old pleasant female with family h/o DM, anemia iron deficiency, and no other significant medical history presents today for her annual wellness check, 3 month check and lab work.    Iron deficiency anemia - chronic - has heavy cycles - she had issues recently and needed transfusion - her H/H is table and improving now - asymptomatic       She exercises regularly and eats healthy      History as below - reviewed in detail      HM  -declined vaccines  -labs due    Cough  Associated symptoms include myalgias. Pertinent negatives include no chest pain, fever, headaches, rhinorrhea, sore throat, shortness of breath, weight loss or wheezing. There is no history of environmental allergies.   Anemia  Presents for follow-up visit. There has been no abdominal pain, bruising/bleeding easily, confusion, fever, light-headedness, pallor, palpitations, pica or weight loss. Signs of blood loss that are not present include hematemesis, melena and vaginal bleeding. Past treatments include changes in diet and oral iron supplements. There is no history of alcohol abuse, cancer, chronic renal disease, dementia, heart failure, HIV/AIDS, inflammatory bowel disease, malabsorption, neuropathy, recent surgery or rheumatic disease. There is no past history of bone marrow exam, EGD or FOBT. Compliance with medications is %.     Review of Systems   Constitutional: Negative.  Negative for activity change, diaphoresis, fever, unexpected weight change and weight loss.   HENT: Negative.  Negative for nasal congestion, ear discharge, hearing loss, rhinorrhea, sore throat and voice change.    Eyes: Negative.  Negative for pain, discharge and visual disturbance.   Respiratory:  Positive for cough. Negative for chest tightness, shortness of breath and wheezing.    Cardiovascular: Negative.  Negative for chest pain and palpitations.    Gastrointestinal: Negative.  Negative for abdominal distention, abdominal pain, anal bleeding, constipation, hematemesis, melena and nausea.   Endocrine: Negative.  Negative for cold intolerance, polydipsia and polyuria.   Genitourinary: Negative.  Negative for decreased urine volume, difficulty urinating, dysuria, frequency, menstrual problem, vaginal bleeding and vaginal pain.   Musculoskeletal:  Positive for leg pain and myalgias. Negative for arthralgias and gait problem.   Integumentary:  Negative for color change, pallor and wound. Negative.   Allergic/Immunologic: Negative.  Negative for environmental allergies and immunocompromised state.   Neurological: Negative.  Negative for dizziness, tremors, seizures, speech difficulty, light-headedness and headaches.   Hematological: Negative.  Negative for adenopathy. Does not bruise/bleed easily.   Psychiatric/Behavioral: Negative.  Negative for agitation, confusion, decreased concentration, hallucinations, self-injury and suicidal ideas. The patient is not nervous/anxious.      Active Ambulatory Problems     Diagnosis Date Noted    History of syncope 2015    Chest pain, central 2015    BMI 25.0-25.9,adult 2015    Syncope and collapse 2015    Chest pain 2015    Pre-syncope 2015    Iron deficiency anemia 10/01/2019    Vitamin D deficiency 10/01/2019     Resolved Ambulatory Problems     Diagnosis Date Noted    Symptomatic anemia 2019     Past Medical History:   Diagnosis Date    Heart disease      Past Surgical History:   Procedure Laterality Date    BREAST RECONSTRUCTION Bilateral 2014    lift     SECTION       Family History   Problem Relation Name Age of Onset    Heart disease Mother      Heart disease Father      Melanoma Neg Hx      Breast cancer Neg Hx      Colon cancer Neg Hx      Ovarian cancer Neg Hx       Social History     Socioeconomic History    Marital status:     Number of children: 2    Tobacco Use    Smoking status: Former    Smokeless tobacco: Never    Tobacco comments:     light smoker   Substance and Sexual Activity    Alcohol use: Yes     Comment: ocacsional    Drug use: No    Sexual activity: Yes     Partners: Male     Birth control/protection: None     Social Drivers of Health     Physical Activity: Sufficiently Active (9/29/2019)    Exercise Vital Sign     Days of Exercise per Week: 4 days     Minutes of Exercise per Session: 120 min   Stress: Stress Concern Present (9/29/2019)    [employer] [place] [employer] - Occupational Stress Questionnaire     Feeling of Stress : To some extent     Review of patient's allergies indicates:  No Known Allergies  Current Outpatient Medications on File Prior to Visit   Medication Sig Dispense Refill    mometasone 0.1% (ELOCON) 0.1 % cream SMARTSIG:Sparingly Topical Twice Daily      mupirocin (BACTROBAN) 2 % ointment Apply topically 3 (three) times daily. 30 g 0    TOVET EMOLLIENT 0.05 % topical foam Apply topically.      ergocalciferol (ERGOCALCIFEROL) 50,000 unit Cap Take 1 capsule (50,000 Units total) by mouth every 7 days. (Patient not taking: Reported on 9/25/2023) 12 capsule 3    tacrolimus (PROTOPIC) 0.1 % ointment Apply topically 2 (two) times daily. (Patient not taking: Reported on 9/25/2023) 60 g 3    tirzepatide (MOUNJARO) 2.5 mg/0.5 mL PnIj Inject 2.5 mg into the skin every 7 days. (Patient not taking: Reported on 9/25/2023) 4 pen 2    triamcinolone acetonide 0.1% (KENALOG) 0.1 % cream APPLY TWICE A DAY TO RASH ON RIGHT LEG (Patient not taking: Reported on 9/25/2023) 30 g 1     No current facility-administered medications on file prior to visit.           Objective:       Vitals:    11/18/24 1317   BP: 130/76   Pulse: 86       Physical Exam  Constitutional:       General: She is not in acute distress.     Appearance: She is well-developed. She is not diaphoretic.   HENT:      Head: Normocephalic and atraumatic.      Right Ear:  External ear normal.      Left Ear: External ear normal.      Nose: Nose normal.      Mouth/Throat:      Pharynx: No oropharyngeal exudate.   Eyes:      General: No scleral icterus.        Right eye: No discharge.         Left eye: No discharge.      Conjunctiva/sclera: Conjunctivae normal.      Pupils: Pupils are equal, round, and reactive to light.   Neck:      Thyroid: No thyromegaly.      Vascular: No JVD.      Trachea: No tracheal deviation.   Cardiovascular:      Rate and Rhythm: Normal rate and regular rhythm.      Heart sounds: Normal heart sounds. No murmur heard.     No friction rub. No gallop.   Pulmonary:      Effort: Pulmonary effort is normal.      Breath sounds: Normal breath sounds. No stridor. No wheezing or rales.   Chest:      Chest wall: No tenderness.   Abdominal:      General: Bowel sounds are normal. There is no distension.      Palpations: Abdomen is soft. There is no mass.      Tenderness: There is no abdominal tenderness. There is no guarding or rebound.      Hernia: No hernia is present.   Musculoskeletal:         General: Tenderness (mild TTp B/L calves) present. Normal range of motion.      Cervical back: Normal range of motion and neck supple.   Lymphadenopathy:      Cervical: No cervical adenopathy.   Skin:     General: Skin is warm and dry.      Coloration: Skin is not pale.      Findings: No erythema or rash.   Neurological:      Mental Status: She is alert and oriented to person, place, and time.      Cranial Nerves: No cranial nerve deficit.      Motor: No abnormal muscle tone.      Coordination: Coordination normal.      Deep Tendon Reflexes: Reflexes are normal and symmetric. Reflexes normal.   Psychiatric:         Behavior: Behavior normal.         Thought Content: Thought content normal.         Judgment: Judgment normal.           Lab Visit on 11/18/2024   Component Date Value Ref Range Status    WBC 11/18/2024 4.43  3.90 - 12.70 K/uL Final    RBC 11/18/2024 4.50  4.00 - 5.40  M/uL Final    Hemoglobin 11/18/2024 10.4 (L)  12.0 - 16.0 g/dL Final    Hematocrit 11/18/2024 34.2 (L)  37.0 - 48.5 % Final    MCV 11/18/2024 76 (L)  82 - 98 fL Final    MCH 11/18/2024 23.1 (L)  27.0 - 31.0 pg Final    MCHC 11/18/2024 30.4 (L)  32.0 - 36.0 g/dL Final    RDW 11/18/2024 17.1 (H)  11.5 - 14.5 % Final    Platelets 11/18/2024 279  150 - 450 K/uL Final    MPV 11/18/2024 12.1  9.2 - 12.9 fL Final    Immature Granulocytes 11/18/2024 0.2  0.0 - 0.5 % Final    Gran # (ANC) 11/18/2024 3.1  1.8 - 7.7 K/uL Final    Immature Grans (Abs) 11/18/2024 0.01  0.00 - 0.04 K/uL Final    Comment: Mild elevation in immature granulocytes is non specific and   can be seen in a variety of conditions including stress response,   acute inflammation, trauma and pregnancy. Correlation with other   laboratory and clinical findings is essential.      Lymph # 11/18/2024 0.9 (L)  1.0 - 4.8 K/uL Final    Mono # 11/18/2024 0.4  0.3 - 1.0 K/uL Final    Eos # 11/18/2024 0.0  0.0 - 0.5 K/uL Final    Baso # 11/18/2024 0.02  0.00 - 0.20 K/uL Final    nRBC 11/18/2024 0  0 /100 WBC Final    Gran % 11/18/2024 69.9  38.0 - 73.0 % Final    Lymph % 11/18/2024 20.1  18.0 - 48.0 % Final    Mono % 11/18/2024 8.4  4.0 - 15.0 % Final    Eosinophil % 11/18/2024 0.9  0.0 - 8.0 % Final    Basophil % 11/18/2024 0.5  0.0 - 1.9 % Final    Differential Method 11/18/2024 Automated   Final    Sodium 11/18/2024 140  136 - 145 mmol/L Final    Potassium 11/18/2024 3.7  3.5 - 5.1 mmol/L Final    Chloride 11/18/2024 107  95 - 110 mmol/L Final    CO2 11/18/2024 22 (L)  23 - 29 mmol/L Final    Glucose 11/18/2024 83  70 - 110 mg/dL Final    BUN 11/18/2024 16  6 - 20 mg/dL Final    Creatinine 11/18/2024 0.7  0.5 - 1.4 mg/dL Final    Calcium 11/18/2024 9.5  8.7 - 10.5 mg/dL Final    Total Protein 11/18/2024 7.4  6.0 - 8.4 g/dL Final    Albumin 11/18/2024 4.2  3.5 - 5.2 g/dL Final    Total Bilirubin 11/18/2024 0.7  0.1 - 1.0 mg/dL Final    Comment: For infants and  newborns, interpretation of results should be based  on gestational age, weight and in agreement with clinical  observations.    Premature Infant recommended reference ranges:  Up to 24 hours.............<8.0 mg/dL  Up to 48 hours............<12.0 mg/dL  3-5 days..................<15.0 mg/dL  6-29 days.................<15.0 mg/dL      Alkaline Phosphatase 11/18/2024 80  40 - 150 U/L Final    AST 11/18/2024 17  10 - 40 U/L Final    ALT 11/18/2024 18  10 - 44 U/L Final    eGFR 11/18/2024 >60  >60 mL/min/1.73 m^2 Final    Anion Gap 11/18/2024 11  8 - 16 mmol/L Final    Cholesterol 11/18/2024 187  120 - 199 mg/dL Final    Comment: The National Cholesterol Education Program (NCEP) has set the  following guidelines (reference ranges) for Cholesterol:  Optimal.....................<200 mg/dL  Borderline High.............200-239 mg/dL  High........................> or = 240 mg/dL      Triglycerides 11/18/2024 143  30 - 150 mg/dL Final    Comment: The National Cholesterol Education Program (NCEP) has set the  following guidelines (reference values) for triglycerides:  Normal......................<150 mg/dL  Borderline High.............150-199 mg/dL  High........................200-499 mg/dL      HDL 11/18/2024 42  40 - 75 mg/dL Final    Comment: The National Cholesterol Education Program (NCEP) has set the  following guidelines (reference values) for HDL Cholesterol:  Low...............<40 mg/dL  Optimal...........>60 mg/dL      LDL Cholesterol 11/18/2024 116.4  63.0 - 159.0 mg/dL Final    Comment: The National Cholesterol Education Program (NCEP) has set the  following guidelines (reference values) for LDL Cholesterol:  Optimal.......................<130 mg/dL  Borderline High...............130-159 mg/dL  High..........................160-189 mg/dL  Very High.....................>190 mg/dL      HDL/Cholesterol Ratio 11/18/2024 22.5  20.0 - 50.0 % Final    Total Cholesterol/HDL Ratio 11/18/2024 4.5  2.0 - 5.0 Final    Non-HDL  Cholesterol 11/18/2024 145  mg/dL Final    Comment: Risk category and Non-HDL cholesterol goals:  Coronary heart disease (CHD)or equivalent (10-year risk of CHD >20%):  Non-HDL cholesterol goal     <130 mg/dL  Two or more CHD risk factors and 10-year risk of CHD <= 20%:  Non-HDL cholesterol goal     <160 mg/dL  0 to 1 CHD risk factor:  Non-HDL cholesterol goal     <190 mg/dL      Iron 11/18/2024 39  30 - 160 ug/dL Final    Transferrin 11/18/2024 358  200 - 375 mg/dL Final    TIBC 11/18/2024 530 (H)  250 - 450 ug/dL Final    Saturated Iron 11/18/2024 7 (L)  20 - 50 % Final    Ferritin 11/18/2024 7 (L)  20.0 - 300.0 ng/mL Final    Vit D, 25-Hydroxy 11/18/2024 25 (L)  30 - 96 ng/mL Final    Comment: Vitamin D deficiency.........<10 ng/mL                              Vitamin D insufficiency......10-29 ng/mL       Vitamin D sufficiency........> or equal to 30 ng/mL  Vitamin D toxicity............>100 ng/mL      Vitamin B-12 11/18/2024 282  210 - 950 pg/mL Final    TSH 11/18/2024 1.830  0.400 - 4.000 uIU/mL Final    Hemoglobin A1C 11/18/2024 4.7  4.0 - 5.6 % Final    Comment: ADA Screening Guidelines:  5.7-6.4%  Consistent with prediabetes  >or=6.5%  Consistent with diabetes    High levels of fetal hemoglobin interfere with the HbA1C  assay. Heterozygous hemoglobin variants (HbS, HgC, etc)do  not significantly interfere with this assay.   However, presence of multiple variants may affect accuracy.      Estimated Avg Glucose 11/18/2024 88  68 - 131 mg/dL Final    D-Dimer 11/18/2024 0.31  <0.50 mg/L FEU Final    Comment: The quantitative D-dimer assay should be used as an aid in   the diagnosis of deep vein thrombosis and pulmonary embolism  in patients with the appropriate presentation and clinical  history. The upper limit of the reference interval and the clinical   cut off   point are identical. Causes of a positive (>0.50 mg/L FEU) D-Dimer   test  include, but are not limited to: DVT, PE, DIC, thrombolytic   therapy,  anticoagulant therapy, recent surgery, trauma, or   pregnancy, disseminated malignancy, aortic aneurysm, cirrhosis,  and severe infection. False negative results may occur in   patients with distal DVT.  SERG^612^^31^  LOT^[id number]         Assessment:       Problem List Items Addressed This Visit       Vitamin D deficiency    Relevant Orders    CBC Auto Differential (Completed)    Comprehensive Metabolic Panel (Completed)    Lipid Panel (Completed)    IRON AND TIBC (Completed)    FERRITIN (Completed)    Vitamin D (Completed)    VITAMIN B12 (Completed)    TSH (Completed)    Hemoglobin A1C (Completed)    D dimer, quantitative (Completed)    Iron deficiency anemia    Relevant Orders    CBC Auto Differential (Completed)    Comprehensive Metabolic Panel (Completed)    Lipid Panel (Completed)    IRON AND TIBC (Completed)    FERRITIN (Completed)    Vitamin D (Completed)    VITAMIN B12 (Completed)    TSH (Completed)    Hemoglobin A1C (Completed)    D dimer, quantitative (Completed)     Other Visit Diagnoses       Routine general medical examination at health care facility    -  Primary    Relevant Orders    CBC Auto Differential (Completed)    Comprehensive Metabolic Panel (Completed)    Lipid Panel (Completed)    IRON AND TIBC (Completed)    FERRITIN (Completed)    Vitamin D (Completed)    VITAMIN B12 (Completed)    TSH (Completed)    Hemoglobin A1C (Completed)    D dimer, quantitative (Completed)    Metabolic syndrome        Relevant Orders    CBC Auto Differential (Completed)    Comprehensive Metabolic Panel (Completed)    Lipid Panel (Completed)    IRON AND TIBC (Completed)    FERRITIN (Completed)    Vitamin D (Completed)    VITAMIN B12 (Completed)    TSH (Completed)    Hemoglobin A1C (Completed)    D dimer, quantitative (Completed)            Plan:           Meri was seen today for annual exam.    Diagnoses and all orders for this visit:    Routine general medical examination at  health care facility  -     CBC Auto Differential; Future  -     Comprehensive Metabolic Panel; Future  -     Lipid Panel; Future  -     IRON AND TIBC; Future  -     FERRITIN; Future  -     Vitamin D; Future  -     VITAMIN B12; Future  -     TSH; Future  -     Hemoglobin A1C; Future  -     D dimer, quantitative; Future    Metabolic syndrome  -     CBC Auto Differential; Future  -     Comprehensive Metabolic Panel; Future  -     Lipid Panel; Future  -     IRON AND TIBC; Future  -     FERRITIN; Future  -     Vitamin D; Future  -     VITAMIN B12; Future  -     TSH; Future  -     Hemoglobin A1C; Future  -     D dimer, quantitative; Future    Vitamin D deficiency  -     CBC Auto Differential; Future  -     Comprehensive Metabolic Panel; Future  -     Lipid Panel; Future  -     IRON AND TIBC; Future  -     FERRITIN; Future  -     Vitamin D; Future  -     VITAMIN B12; Future  -     TSH; Future  -     Hemoglobin A1C; Future  -     D dimer, quantitative; Future    Iron deficiency anemia, unspecified iron deficiency anemia type  -     CBC Auto Differential; Future  -     Comprehensive Metabolic Panel; Future  -     Lipid Panel; Future  -     IRON AND TIBC; Future  -     FERRITIN; Future  -     Vitamin D; Future  -     VITAMIN B12; Future  -     TSH; Future  -     Hemoglobin A1C; Future  -     D dimer, quantitative; Future      Wellness check  -normal exam  -labs      Anemia  -lab repeat  -continue iron    Vit D def  -replacement OTC  -bone density          Spent adequate time in obtaining history and explaining differentials      Follow up in about 1 year (around 11/18/2025), or if symptoms worsen or fail to improve.

## 2024-11-19 ENCOUNTER — TELEPHONE (OUTPATIENT)
Dept: FAMILY MEDICINE | Facility: CLINIC | Age: 44
End: 2024-11-19

## 2024-11-19 NOTE — TELEPHONE ENCOUNTER
----- Message from Jazmin sent at 11/19/2024  8:48 AM CST -----  Type:  Needs Medical Advice/testing     Who Called: pt    Would the patient rather a call back or a response via [facility]? Call   Best Call Back Number: [phone]  Additional Information: pt requesting a call back to discuss testing for body retaining  water is there any solutions to it.

## 2024-11-19 NOTE — TELEPHONE ENCOUNTER
----- Message from Evelio sent at 11/19/2024  3:17 PM CST -----  Regarding: self  Type:  Patient Returning Call    Who Called:self    Who Left Message for Patient: Deanna Villalpando MA    Does the patient know what this is regarding?:no    Would the patient rather a call back or a response via [facility]?callback    Best Call Back Number:[phone]    Additional Information:

## 2024-11-19 NOTE — TELEPHONE ENCOUNTER
----- Message from Yessica sent at 11/19/2024  4:33 PM CST -----  Type:  Patient Returning Call    Who Called:Pt  Who Left Message for Patient:Nurse Pulido  Does the patient know what this is regarding?:Test Results  Would the patient rather a call back or a response via [facility]? Call Back  Best Call Back Number:[phone]  Additional Information:

## 2024-11-19 NOTE — TELEPHONE ENCOUNTER
----- Message from Agueda sent at 11/19/2024 10:25 AM CST -----  Type: Patient Call Back    Who called:pt     What is the request in detail:pt calling to speak to nurse in regards to lab tests. Please call pt to discuss. She forgot to ask about this. Call pt     Can the clinic reply by [facility]?    Would the patient rather a call back or a response via [facility]? call    Best call back number:[phone] (home)       Additional Information:

## 2025-03-12 ENCOUNTER — OFFICE VISIT (OUTPATIENT)
Dept: FAMILY MEDICINE | Facility: CLINIC | Age: 45
End: 2025-03-12
Payer: COMMERCIAL

## 2025-03-12 ENCOUNTER — LAB VISIT (OUTPATIENT)
Dept: LAB | Facility: HOSPITAL | Age: 45
End: 2025-03-12
Payer: COMMERCIAL

## 2025-03-12 VITALS
HEIGHT: 67 IN | SYSTOLIC BLOOD PRESSURE: 130 MMHG | OXYGEN SATURATION: 96 % | DIASTOLIC BLOOD PRESSURE: 74 MMHG | WEIGHT: 218.69 LBS | BODY MASS INDEX: 34.33 KG/M2 | HEART RATE: 105 BPM | TEMPERATURE: 99 F

## 2025-03-12 DIAGNOSIS — R05.1 ACUTE COUGH: ICD-10-CM

## 2025-03-12 DIAGNOSIS — R09.81 NASAL CONGESTION: ICD-10-CM

## 2025-03-12 DIAGNOSIS — B97.89 VIRAL RESPIRATORY ILLNESS: ICD-10-CM

## 2025-03-12 DIAGNOSIS — R35.0 URINARY FREQUENCY: ICD-10-CM

## 2025-03-12 DIAGNOSIS — U07.1 COVID-19: Primary | ICD-10-CM

## 2025-03-12 DIAGNOSIS — R06.2 WHEEZING: ICD-10-CM

## 2025-03-12 DIAGNOSIS — J98.8 VIRAL RESPIRATORY ILLNESS: ICD-10-CM

## 2025-03-12 LAB
BACTERIA #/AREA URNS HPF: ABNORMAL /HPF
BILIRUB UR QL STRIP: NEGATIVE
CLARITY UR: CLEAR
COLOR UR: YELLOW
CTP QC/QA: YES
CTP QC/QA: YES
GLUCOSE UR QL STRIP: NEGATIVE
HGB UR QL STRIP: ABNORMAL
KETONES UR QL STRIP: ABNORMAL
LEUKOCYTE ESTERASE UR QL STRIP: ABNORMAL
MICROSCOPIC COMMENT: ABNORMAL
NITRITE UR QL STRIP: NEGATIVE
PH UR STRIP: 7 [PH] (ref 5–8)
POC MOLECULAR INFLUENZA A AGN: NEGATIVE
POC MOLECULAR INFLUENZA B AGN: NEGATIVE
PROT UR QL STRIP: ABNORMAL
RBC #/AREA URNS HPF: 5 /HPF (ref 0–4)
SARS-COV-2 RDRP RESP QL NAA+PROBE: POSITIVE
SP GR UR STRIP: >1.03 (ref 1–1.03)
SQUAMOUS #/AREA URNS HPF: 5 /HPF
URN SPEC COLLECT METH UR: ABNORMAL
UROBILINOGEN UR STRIP-ACNC: ABNORMAL EU/DL
WBC #/AREA URNS HPF: 5 /HPF (ref 0–5)

## 2025-03-12 PROCEDURE — 87186 SC STD MICRODIL/AGAR DIL: CPT

## 2025-03-12 PROCEDURE — 87088 URINE BACTERIA CULTURE: CPT

## 2025-03-12 PROCEDURE — 81000 URINALYSIS NONAUTO W/SCOPE: CPT

## 2025-03-12 PROCEDURE — 87086 URINE CULTURE/COLONY COUNT: CPT

## 2025-03-12 PROCEDURE — 99999 PR PBB SHADOW E&M-EST. PATIENT-LVL IV: CPT | Mod: PBBFAC,,,

## 2025-03-12 RX ORDER — FLUTICASONE PROPIONATE 50 MCG
1 SPRAY, SUSPENSION (ML) NASAL DAILY
Qty: 15.8 ML | Refills: 0 | Status: SHIPPED | OUTPATIENT
Start: 2025-03-12

## 2025-03-12 RX ORDER — ALBUTEROL SULFATE 90 UG/1
2 INHALANT RESPIRATORY (INHALATION) EVERY 6 HOURS PRN
Qty: 6.7 G | Refills: 0 | Status: SHIPPED | OUTPATIENT
Start: 2025-03-12 | End: 2026-03-12

## 2025-03-12 RX ORDER — NIRMATRELVIR AND RITONAVIR 300-100 MG
KIT ORAL
Qty: 30 TABLET | Refills: 0 | Status: SHIPPED | OUTPATIENT
Start: 2025-03-12 | End: 2025-03-17

## 2025-03-12 RX ORDER — PROMETHAZINE HYDROCHLORIDE AND DEXTROMETHORPHAN HYDROBROMIDE 6.25; 15 MG/5ML; MG/5ML
5 SYRUP ORAL 2 TIMES DAILY PRN
Qty: 180 ML | Refills: 1 | Status: SHIPPED | OUTPATIENT
Start: 2025-03-12

## 2025-03-12 NOTE — PATIENT INSTRUCTIONS
Respiratory Virus Home Information:  Stay home and away from others (including people you live with who are not sick) if you have respiratory virus symptoms. These symptoms can include fever, chills, fatigue, cough, runny nose, and headache, among others.    Isolation:   You can go back to your normal activities when, for at least 24 hours, both are true:  -Your symptoms are getting better overall, and  -You have not had a fever (and are not using fever-reducing medication).  -When you go back to your normal activities, take added precaution over the next 5 days, such as hand hygiene, wearing a mask, and physical distancing while around others indoors.   -Keep in mind that you may still be able to spread the virus that made you sick, even if you are feeling better. You are likely to be less contagious at this time, depending on factors like how long you were sick or how sick you were.  -If you develop a fever or you start to feel worse after you have gone back to normal activities, stay home and away from others again until, for at least 24 hours, both are true: your symptoms are improving overall, and you have not had a fever (and are not using fever-reducing medication). Then take added precaution for the next 5 days.    Home treatment:   -Drink plenty of fluids  -Rest as much as possible  -You can use a humidifier or can take a steamy shower for sinus congestion  -You can also use saline nose drops/wash to relieve stuffiness  -Use nasal spray as prescribed  -Use allergy medication daily (Xyzal, Claritin, Zyrtec, etc)  -Use OTC lozenges for sore throat as needed  -Use cough medication as needed  -Tylenol 325 to 650 mg every 4 to 6 hours as needed or 1 g every 6 hours as needed for pain and/or fever; maximum dose: 4 g/day   -Ibuprofen 200 to 400 mg every 4 to 6 hours as needed or 600 to 800 mg every 6 to 8 hours as needed for pain and/or fever; maximum dose: 3.2 g/day   -If you decide to take over-the-counter cough  or cold medicines, follow the directions on the label carefully. Be sure you do not take more than 1 medicine that contains acetaminophen.   -Practice good hand hygiene to reduce the spread of infection  -Return to clinic or follow up with you PCP if symptoms worsen or do not improve with treatment         ER/Urgent Care precautions discussed with patient. Go to ER for new or worsening symptoms.

## 2025-03-12 NOTE — PROGRESS NOTES
Subjective     Patient ID: Meri Cosme is a 44 y.o. female.    Chief Complaint: Fatigue (Bloated, sore throat, cough, body aches, warm, congestion, scratchy throat, cough)      Patient is a pleasant 44 year old female with LINDEN and Vitamin D deficiency that presents to clinic alone today as a new patient to me, established with Dr. Alston, for c/o cough, chills, body aches, fatigue. Symptoms started yesterday and feeling worse today. Denies CP, SOB. Endorses dry cough, scratchy throat, ear pain, body aches. Denies fevers. Has not taken anything for symptoms and has been resting. Would normally be fasting at this time but has been drinking water today due to not feeling well. No known exposure to flu or Covid and no testing done.     Review of Systems   Constitutional:  Positive for chills and fatigue. Negative for fever.   HENT:  Positive for nasal congestion, ear pain and sore throat.    Eyes:  Negative for visual disturbance.   Respiratory:  Positive for cough. Negative for shortness of breath.    Cardiovascular:  Negative for chest pain and palpitations.   Gastrointestinal:  Negative for abdominal pain, diarrhea, nausea and vomiting.   Genitourinary:  Positive for frequency and urgency. Negative for difficulty urinating and dysuria.   Musculoskeletal:  Positive for myalgias. Negative for back pain and neck pain.   Integumentary:  Negative for rash.   Neurological:  Positive for headaches. Negative for dizziness.   Psychiatric/Behavioral:  Negative for confusion and hallucinations.      Past Medical History:   Diagnosis Date    Heart disease        Past Surgical History:   Procedure Laterality Date    BREAST RECONSTRUCTION Bilateral 2014    lift     SECTION         Family History   Problem Relation Name Age of Onset    Heart disease Mother      Heart disease Father      Melanoma Neg Hx      Breast cancer Neg Hx      Colon cancer Neg Hx      Ovarian cancer Neg Hx         Social History     Socioeconomic  "History    Marital status:     Number of children: 2   Tobacco Use    Smoking status: Former    Smokeless tobacco: Never    Tobacco comments:     light smoker   Substance and Sexual Activity    Alcohol use: Yes     Comment: ocacsional    Drug use: No    Sexual activity: Yes     Partners: Male     Birth control/protection: None     Social Drivers of Health     Physical Activity: Sufficiently Active (9/29/2019)    Exercise Vital Sign     Days of Exercise per Week: 4 days     Minutes of Exercise per Session: 120 min   Stress: Stress Concern Present (9/29/2019)    [facility]ational [facility] - Occupational Stress Questionnaire     Feeling of Stress : To some extent       Current Medications[1]    Review of patient's allergies indicates:  No Known Allergies      Objective     Vitals:    03/12/25 1339   BP: 130/74   Pulse: 105   Temp: 98.8 °F (37.1 °C)   TempSrc: Oral   SpO2: 96%   Weight: 99.2 kg (218 lb 11.1 oz)   Height: 5' 7" (1.702 m)   PainSc:   7      Physical Exam  Vitals and nursing note reviewed.   Constitutional:       General: She is not in acute distress.     Appearance: Normal appearance. She is ill-appearing.   HENT:      Head: Normocephalic and atraumatic.      Right Ear: A middle ear effusion is present.      Left Ear: A middle ear effusion is present.      Nose: Congestion and rhinorrhea present. Rhinorrhea is clear.      Right Turbinates: Enlarged.      Left Turbinates: Enlarged.      Mouth/Throat:      Lips: Pink.      Mouth: Mucous membranes are moist.      Pharynx: Oropharynx is clear. Postnasal drip present. No pharyngeal swelling or oropharyngeal exudate.   Eyes:      General: No scleral icterus.        Right eye: No discharge.         Left eye: No discharge.      Extraocular Movements: Extraocular movements intact.      Conjunctiva/sclera: Conjunctivae normal.   Cardiovascular:      Rate and Rhythm: Regular rhythm. Tachycardia present.      Pulses: Normal pulses.      Heart " sounds: Normal heart sounds. No murmur heard.  Pulmonary:      Effort: Pulmonary effort is normal. No respiratory distress.      Breath sounds: Wheezing present. No rhonchi or rales.   Abdominal:      General: Abdomen is flat. Bowel sounds are normal. There is no distension.      Palpations: Abdomen is soft. There is no mass.      Tenderness: There is no abdominal tenderness.   Musculoskeletal:         General: Normal range of motion.      Cervical back: Normal range of motion.      Right lower leg: No edema.      Left lower leg: No edema.   Skin:     General: Skin is warm and dry.      Findings: No rash.   Neurological:      General: No focal deficit present.      Mental Status: She is alert and oriented to person, place, and time.   Psychiatric:         Mood and Affect: Mood normal.         Behavior: Behavior normal. Behavior is cooperative.         Cognition and Memory: Cognition and memory normal.            Assessment and Plan     1. COVID-19  - New problem; +covid test today in clinic. Take medications as prescribed. RTC for new or worsening symptoms.   -     nirmatrelvir-ritonavir (PAXLOVID) 300 mg (150 mg x 2)-100 mg copackaged tablets (EUA); Take 3 tablets by mouth 2 (two) times daily. Each dose contains 2 nirmatrelvir (pink tablets) and 1 ritonavir (white tablet). Take all 3 tablets together  Dispense: 30 tablet; Refill: 0    2. Viral respiratory illness  3. Acute cough  4. Wheezing  5. Nasal congestion  - New problem; started with symptoms yesterday. Take medications as prescribed for symptoms. RTC for worsening symptoms. ER precautions advised. Rest and stay hydrated.   -     POCT COVID-19 Rapid Screening- positive today  -     POCT Influenza A/B Molecular- negative today  -     promethazine-dextromethorphan (PROMETHAZINE-DM) 6.25-15 mg/5 mL Syrp; Take 5 mLs by mouth 2 (two) times daily as needed (cough).  Dispense: 180 mL; Refill: 1  -     fluticasone propionate (FLONASE) 50 mcg/actuation nasal spray; 1  spray (50 mcg total) by Each Nostril route once daily.  Dispense: 15.8 mL; Refill: 0  -     albuterol (VENTOLIN HFA) 90 mcg/actuation inhaler; Inhale 2 puffs into the lungs every 6 (six) hours as needed for Wheezing or Shortness of Breath. Rescue  Dispense: 6.7 g; Refill: 0    6. Urinary frequency  - New problem; +urinary frequency and urgency; denies dysuria/hematuria. Urine and urine culture ordered- will reach out with results when received.   -     Urinalysis; Future; Expected date: 03/12/2025  -     Urine Culture High Risk; Future; Expected date: 03/12/2025      ER/Urgent Care precautions discussed with patient. Go to ER for new or worsening symptoms.           Follow up in about 2 weeks (around 3/26/2025), or if symptoms worsen or fail to improve.    35 minutes of total time spent on the encounter, which includes face to face time and non-face to face time preparing to see the patient (eg, review of tests), Obtaining and/or reviewing separately obtained history, Documenting clinical information in the electronic or other health record, Independently interpreting results (not separately reported) and communicating results to the patient/family/caregiver, or Care coordination (not separately reported).      Carmela Cespedes, MSN, APRN, FNP-C  Family Medicine  Office #[phone]          [1]   Current Outpatient Medications   Medication Sig Dispense Refill    ergocalciferol (ERGOCALCIFEROL) 50,000 unit Cap Take 1 capsule (50,000 Units total) by mouth every 7 days. 12 capsule 3    mometasone 0.1% (ELOCON) 0.1 % cream SMARTSIG:Sparingly Topical Twice Daily      mupirocin (BACTROBAN) 2 % ointment Apply topically 3 (three) times daily. 30 g 0    tacrolimus (PROTOPIC) 0.1 % ointment Apply topically 2 (two) times daily. 60 g 3    tirzepatide (MOUNJARO) 2.5 mg/0.5 mL PnIj Inject 2.5 mg into the skin every 7 days. 4 pen 2    triamcinolone acetonide 0.1% (KENALOG) 0.1 % cream APPLY TWICE A DAY TO RASH ON RIGHT LEG 30 g  1    albuterol (VENTOLIN HFA) 90 mcg/actuation inhaler Inhale 2 puffs into the lungs every 6 (six) hours as needed for Wheezing or Shortness of Breath. Rescue 6.7 g 0    fluticasone propionate (FLONASE) 50 mcg/actuation nasal spray 1 spray (50 mcg total) by Each Nostril route once daily. 15.8 mL 0    nirmatrelvir-ritonavir (PAXLOVID) 300 mg (150 mg x 2)-100 mg copackaged tablets (EUA) Take 3 tablets by mouth 2 (two) times daily. Each dose contains 2 nirmatrelvir (pink tablets) and 1 ritonavir (white tablet). Take all 3 tablets together 30 tablet 0    promethazine-dextromethorphan (PROMETHAZINE-DM) 6.25-15 mg/5 mL Syrp Take 5 mLs by mouth 2 (two) times daily as needed (cough). 180 mL 1    TOVET EMOLLIENT 0.05 % topical foam Apply topically. (Patient not taking: Reported on 3/12/2025)       No current facility-administered medications for this visit.

## 2025-03-13 ENCOUNTER — RESULTS FOLLOW-UP (OUTPATIENT)
Dept: FAMILY MEDICINE | Facility: CLINIC | Age: 45
End: 2025-03-13

## 2025-03-13 DIAGNOSIS — N30.00 ACUTE CYSTITIS WITHOUT HEMATURIA: Primary | ICD-10-CM

## 2025-03-14 ENCOUNTER — TELEPHONE (OUTPATIENT)
Dept: RHEUMATOLOGY | Facility: CLINIC | Age: 45
End: 2025-03-14
Payer: COMMERCIAL

## 2025-03-14 RX ORDER — NITROFURANTOIN 25; 75 MG/1; MG/1
100 CAPSULE ORAL 2 TIMES DAILY
Qty: 10 CAPSULE | Refills: 0 | Status: SHIPPED | OUTPATIENT
Start: 2025-03-14 | End: 2025-03-19

## 2025-03-14 NOTE — TELEPHONE ENCOUNTER
----- Message from DIOMEDES Gallego sent at 3/14/2025  1:41 PM CDT -----  Please call patient.  I am going to send an antibiotic to your pharmacy to start taking for the UTI. I am still waiting on the final urine culture but if I need to change the antibiotic, I will let you know. I am starting   you on Macrobid. Take this antibiotic for 5 days, twice a day.   ----- Message -----  From: [facility]  Sent: 3/12/2025   4:38 PM CDT  To: DIOMEDES Gallego

## 2025-03-15 LAB — BACTERIA UR CULT: ABNORMAL

## 2025-05-21 ENCOUNTER — OFFICE VISIT (OUTPATIENT)
Dept: FAMILY MEDICINE | Facility: CLINIC | Age: 45
End: 2025-05-21
Attending: FAMILY MEDICINE
Payer: COMMERCIAL

## 2025-05-21 VITALS
DIASTOLIC BLOOD PRESSURE: 76 MMHG | OXYGEN SATURATION: 99 % | HEART RATE: 84 BPM | TEMPERATURE: 98 F | SYSTOLIC BLOOD PRESSURE: 130 MMHG | BODY MASS INDEX: 34.33 KG/M2 | HEIGHT: 67 IN | WEIGHT: 218.69 LBS

## 2025-05-21 DIAGNOSIS — Z00.00 ROUTINE GENERAL MEDICAL EXAMINATION AT HEALTH CARE FACILITY: Primary | ICD-10-CM

## 2025-05-21 DIAGNOSIS — E55.9 VITAMIN D DEFICIENCY: ICD-10-CM

## 2025-05-21 DIAGNOSIS — D50.9 IRON DEFICIENCY ANEMIA, UNSPECIFIED IRON DEFICIENCY ANEMIA TYPE: ICD-10-CM

## 2025-05-21 PROCEDURE — 1160F RVW MEDS BY RX/DR IN RCRD: CPT | Mod: CPTII,S$GLB,, | Performed by: FAMILY MEDICINE

## 2025-05-21 PROCEDURE — 3075F SYST BP GE 130 - 139MM HG: CPT | Mod: CPTII,S$GLB,, | Performed by: FAMILY MEDICINE

## 2025-05-21 PROCEDURE — 3078F DIAST BP <80 MM HG: CPT | Mod: CPTII,S$GLB,, | Performed by: FAMILY MEDICINE

## 2025-05-21 PROCEDURE — 3008F BODY MASS INDEX DOCD: CPT | Mod: CPTII,S$GLB,, | Performed by: FAMILY MEDICINE

## 2025-05-21 PROCEDURE — 99999 PR PBB SHADOW E&M-EST. PATIENT-LVL III: CPT | Mod: PBBFAC,,, | Performed by: FAMILY MEDICINE

## 2025-05-21 PROCEDURE — 99396 PREV VISIT EST AGE 40-64: CPT | Mod: S$GLB,,, | Performed by: FAMILY MEDICINE

## 2025-05-21 PROCEDURE — 1159F MED LIST DOCD IN RCRD: CPT | Mod: CPTII,S$GLB,, | Performed by: FAMILY MEDICINE

## 2025-05-21 RX ORDER — ERGOCALCIFEROL 1.25 MG/1
50000 CAPSULE ORAL
Qty: 12 CAPSULE | Refills: 3 | Status: SHIPPED | OUTPATIENT
Start: 2025-05-21

## 2025-05-21 NOTE — PROGRESS NOTES
Subjective:       Patient ID: Meri Cosme is a 44 y.o. female.    Chief Complaint: Annual Exam    44 yr old pleasant female with family h/o DM, anemia iron deficiency, and no other significant medical history presents today for her annual wellness check, 3 month check and lab work.    Iron deficiency anemia - chronic - has heavy cycles - she had issues recently and needed transfusion - her H/H is table and improving now - asymptomatic       She exercises regularly and eats healthy      History as below - reviewed in detail      HM  -declined vaccines  -labs due    Cough  Associated symptoms include myalgias. Pertinent negatives include no chest pain, fever, headaches, rhinorrhea, sore throat, shortness of breath, weight loss or wheezing. There is no history of environmental allergies.   Anemia  Presents for follow-up visit. There has been no abdominal pain, bruising/bleeding easily, confusion, fever, light-headedness, pallor, palpitations, pica or weight loss. Signs of blood loss that are not present include hematemesis, melena and vaginal bleeding. Past treatments include changes in diet and oral iron supplements. There is no history of alcohol abuse, cancer, chronic renal disease, dementia, heart failure, HIV/AIDS, inflammatory bowel disease, malabsorption, neuropathy, recent surgery or rheumatic disease. There is no past history of bone marrow exam, EGD or FOBT. Compliance with medications is %.     Review of Systems   Constitutional: Negative.  Negative for activity change, diaphoresis, fever, unexpected weight change and weight loss.   HENT: Negative.  Negative for nasal congestion, ear discharge, hearing loss, rhinorrhea, sore throat and voice change.    Eyes: Negative.  Negative for pain, discharge and visual disturbance.   Respiratory:  Positive for cough. Negative for chest tightness, shortness of breath and wheezing.    Cardiovascular: Negative.  Negative for chest pain and palpitations.    Gastrointestinal: Negative.  Negative for abdominal distention, abdominal pain, anal bleeding, constipation, hematemesis, melena and nausea.   Endocrine: Negative.  Negative for cold intolerance, polydipsia and polyuria.   Genitourinary: Negative.  Negative for decreased urine volume, difficulty urinating, dysuria, frequency, menstrual problem, vaginal bleeding and vaginal pain.   Musculoskeletal:  Positive for leg pain and myalgias. Negative for arthralgias and gait problem.   Integumentary:  Negative for color change, pallor and wound. Negative.   Allergic/Immunologic: Negative.  Negative for environmental allergies and immunocompromised state.   Neurological: Negative.  Negative for dizziness, tremors, seizures, speech difficulty, light-headedness and headaches.   Hematological: Negative.  Negative for adenopathy. Does not bruise/bleed easily.   Psychiatric/Behavioral: Negative.  Negative for agitation, confusion, decreased concentration, hallucinations, self-injury and suicidal ideas. The patient is not nervous/anxious.      Active Ambulatory Problems     Diagnosis Date Noted    History of syncope 2015    BMI 25.0-25.9,adult 2015    Syncope and collapse 2015    Pre-syncope 2015    Iron deficiency anemia 10/01/2019    Vitamin D deficiency 10/01/2019     Resolved Ambulatory Problems     Diagnosis Date Noted    Chest pain, central 2015    Chest pain 2015    Symptomatic anemia 2019     Past Medical History:   Diagnosis Date    Heart disease      Past Surgical History:   Procedure Laterality Date    BREAST RECONSTRUCTION Bilateral 2014    lift     SECTION       Family History   Problem Relation Name Age of Onset    Heart disease Mother      Heart disease Father      Melanoma Neg Hx      Breast cancer Neg Hx      Colon cancer Neg Hx      Ovarian cancer Neg Hx       Social History     Socioeconomic History    Marital status:     Number of children: 2    Tobacco Use    Smoking status: Former    Smokeless tobacco: Never    Tobacco comments:     light smoker   Substance and Sexual Activity    Alcohol use: Yes     Comment: ocacsional    Drug use: No    Sexual activity: Yes     Partners: Male     Birth control/protection: None     Social Drivers of Health     Physical Activity: Sufficiently Active (9/29/2019)    Exercise Vital Sign     Days of Exercise per Week: 4 days     Minutes of Exercise per Session: 120 min   Stress: Stress Concern Present (9/29/2019)    [facility] of Occupational Health - Occupational Stress Questionnaire     Feeling of Stress : To some extent     Review of patient's allergies indicates:  No Known Allergies  Current Outpatient Medications on File Prior to Visit   Medication Sig Dispense Refill    fluticasone propionate (FLONASE) 50 mcg/actuation nasal spray 1 spray (50 mcg total) by Each Nostril route once daily. 15.8 mL 0    tirzepatide (MOUNJARO) 2.5 mg/0.5 mL PnIj Inject 2.5 mg into the skin every 7 days. 4 pen 2    TOVET EMOLLIENT 0.05 % topical foam Apply topically.      triamcinolone acetonide 0.1% (KENALOG) 0.1 % cream APPLY TWICE A DAY TO RASH ON RIGHT LEG 30 g 1    [DISCONTINUED] albuterol (VENTOLIN HFA) 90 mcg/actuation inhaler Inhale 2 puffs into the lungs every 6 (six) hours as needed for Wheezing or Shortness of Breath. Rescue 6.7 g 0    [DISCONTINUED] ergocalciferol (ERGOCALCIFEROL) 50,000 unit Cap Take 1 capsule (50,000 Units total) by mouth every 7 days. 12 capsule 3    [DISCONTINUED] mometasone 0.1% (ELOCON) 0.1 % cream SMARTSIG:Sparingly Topical Twice Daily      [DISCONTINUED] mupirocin (BACTROBAN) 2 % ointment Apply topically 3 (three) times daily. 30 g 0    [DISCONTINUED] promethazine-dextromethorphan (PROMETHAZINE-DM) 6.25-15 mg/5 mL Syrp Take 5 mLs by mouth 2 (two) times daily as needed (cough). 180 mL 1    [DISCONTINUED] tacrolimus (PROTOPIC) 0.1 % ointment Apply topically 2 (two) times daily. 60 g 3     No current  facility-administered medications on file prior to visit.           Objective:       Vitals:    05/21/25 1518   BP: 130/76   Pulse: 84   Temp: 97.6 °F (36.4 °C)       Physical Exam  Constitutional:       General: She is not in acute distress.     Appearance: She is well-developed. She is not diaphoretic.   HENT:      Head: Normocephalic and atraumatic.      Right Ear: External ear normal.      Left Ear: External ear normal.      Nose: Nose normal.      Mouth/Throat:      Pharynx: No oropharyngeal exudate.   Eyes:      General: No scleral icterus.        Right eye: No discharge.         Left eye: No discharge.      Conjunctiva/sclera: Conjunctivae normal.      Pupils: Pupils are equal, round, and reactive to light.   Neck:      Thyroid: No thyromegaly.      Vascular: No JVD.      Trachea: No tracheal deviation.   Cardiovascular:      Rate and Rhythm: Normal rate and regular rhythm.      Heart sounds: Normal heart sounds. No murmur heard.     No friction rub. No gallop.   Pulmonary:      Effort: Pulmonary effort is normal.      Breath sounds: Normal breath sounds. No stridor. No wheezing or rales.   Chest:      Chest wall: No tenderness.   Abdominal:      General: Bowel sounds are normal. There is no distension.      Palpations: Abdomen is soft. There is no mass.      Tenderness: There is no abdominal tenderness. There is no guarding or rebound.      Hernia: No hernia is present.   Musculoskeletal:         General: Tenderness (mild TTp B/L calves) present. Normal range of motion.      Cervical back: Normal range of motion and neck supple.   Lymphadenopathy:      Cervical: No cervical adenopathy.   Skin:     General: Skin is warm and dry.      Coloration: Skin is not pale.      Findings: No erythema or rash.   Neurological:      Mental Status: She is alert and oriented to person, place, and time.      Cranial Nerves: No cranial nerve deficit.      Motor: No abnormal muscle tone.      Coordination: Coordination normal.       Deep Tendon Reflexes: Reflexes are normal and symmetric. Reflexes normal.   Psychiatric:         Behavior: Behavior normal.         Thought Content: Thought content normal.         Judgment: Judgment normal.           No visits with results within 1 Day(s) from this visit.   Latest known visit with results is:   Lab Visit on 03/12/2025   Component Date Value Ref Range Status    Specimen UA 03/12/2025 Urine, Clean Catch   Final    Color, UA 03/12/2025 Yellow  Yellow, Straw, [place] Final    Appearance, UA 03/12/2025 Clear  Clear Final    pH, UA 03/12/2025 7.0  5.0 - 8.0 Final    Specific Gravity, UA 03/12/2025 >1.030 (A)  1.005 - 1.030 Final    Protein, UA 03/12/2025 Trace (A)  Negative Final    Comment: Recommend a 24 hour urine protein or a urine   protein/creatinine ratio if globulin induced proteinuria is  clinically suspected.      Glucose, UA 03/12/2025 Negative  Negative Final    Ketones, UA 03/12/2025 3+ (A)  Negative Final    Bilirubin (UA) 03/12/2025 Negative  Negative Final    Occult Blood UA 03/12/2025 1+ (A)  Negative Final    Nitrite, UA 03/12/2025 Negative  Negative Final    Urobilinogen, UA 03/12/2025 4.0-6.0 (A)  <2.0 EU/dL Final    Leukocytes, UA 03/12/2025 1+ (A)  Negative Final    Urine Culture, Routine 03/12/2025  (A)   Final                    Value:ESCHERICHIA COLI  10,000 - 49,999 cfu/ml      RBC, UA 03/12/2025 5 (H)  0 - 4 /hpf Final    WBC, UA 03/12/2025 5  0 - 5 /hpf Final    Bacteria 03/12/2025 Rare  None-Occ /hpf Final    Squam Epithel, UA 03/12/2025 5  /hpf Final    Microscopic Comment 03/12/2025 SEE COMMENT   Final    Comment: Other formed elements not mentioned in the report are not   present in the microscopic examination.          Assessment:       Problem List Items Addressed This Visit       Vitamin D deficiency    Relevant Medications    ergocalciferol (ERGOCALCIFEROL) 50,000 unit Cap    Other Relevant Orders    CBC Auto Differential    Comprehensive Metabolic Panel    Lipid  Panel    TSH    Hemoglobin A1C    D-Dimer, Quantitative    Vitamin D    Cortisol, 8AM    Iron deficiency anemia    Relevant Orders    CBC Auto Differential    Comprehensive Metabolic Panel    Lipid Panel    TSH    Hemoglobin A1C    D-Dimer, Quantitative    Iron and TIBC    Ferritin    Cortisol, 8AM     Other Visit Diagnoses         Routine general medical examination at health care facility    -  Primary    Relevant Orders    CBC Auto Differential    Comprehensive Metabolic Panel    Lipid Panel    TSH    Hemoglobin A1C    D-Dimer, Quantitative    Vitamin D    Cortisol, 8AM            Plan:           Meri was seen today for annual exam.    Diagnoses and all orders for this visit:    Routine general medical examination at health care facility  -     CBC Auto Differential; Future  -     Comprehensive Metabolic Panel; Future  -     Lipid Panel; Future  -     TSH; Future  -     Hemoglobin A1C; Future  -     D-Dimer, Quantitative; Future  -     Vitamin D; Future  -     Cortisol, 8AM; Future    Vitamin D deficiency  -     CBC Auto Differential; Future  -     Comprehensive Metabolic Panel; Future  -     Lipid Panel; Future  -     TSH; Future  -     Hemoglobin A1C; Future  -     D-Dimer, Quantitative; Future  -     Vitamin D; Future  -     ergocalciferol (ERGOCALCIFEROL) 50,000 unit Cap; Take 1 capsule (50,000 Units total) by mouth every 7 days.  -     Cortisol, 8AM; Future    Iron deficiency anemia, unspecified iron deficiency anemia type  -     CBC Auto Differential; Future  -     Comprehensive Metabolic Panel; Future  -     Lipid Panel; Future  -     TSH; Future  -     Hemoglobin A1C; Future  -     D-Dimer, Quantitative; Future  -     Iron and TIBC; Future  -     Ferritin; Future  -     Cortisol, 8AM; Future      Wellness check  -normal exam  -labs      Anemia  -lab repeat  -continue iron    Vit D def  -replacement OTC  -bone density          Spent adequate time in obtaining history and explaining  differentials      Follow up in about 1 year (around 5/21/2026), or if symptoms worsen or fail to improve.

## 2025-05-22 ENCOUNTER — LAB VISIT (OUTPATIENT)
Dept: LAB | Facility: HOSPITAL | Age: 45
End: 2025-05-22
Attending: FAMILY MEDICINE
Payer: COMMERCIAL

## 2025-05-22 DIAGNOSIS — E55.9 VITAMIN D DEFICIENCY: ICD-10-CM

## 2025-05-22 DIAGNOSIS — D50.9 IRON DEFICIENCY ANEMIA, UNSPECIFIED IRON DEFICIENCY ANEMIA TYPE: ICD-10-CM

## 2025-05-22 DIAGNOSIS — Z00.00 ROUTINE GENERAL MEDICAL EXAMINATION AT HEALTH CARE FACILITY: ICD-10-CM

## 2025-05-22 LAB
25(OH)D3+25(OH)D2 SERPL-MCNC: 22 NG/ML (ref 30–96)
ABSOLUTE EOSINOPHIL (OHS): 0.11 K/UL
ABSOLUTE MONOCYTE (OHS): 0.35 K/UL (ref 0.3–1)
ABSOLUTE NEUTROPHIL COUNT (OHS): 2.1 K/UL (ref 1.8–7.7)
ALBUMIN SERPL BCP-MCNC: 3.7 G/DL (ref 3.5–5.2)
ALP SERPL-CCNC: 88 UNIT/L (ref 40–150)
ALT SERPL W/O P-5'-P-CCNC: 16 UNIT/L (ref 10–44)
ANION GAP (OHS): 8 MMOL/L (ref 8–16)
AST SERPL-CCNC: 12 UNIT/L (ref 11–45)
BASOPHILS # BLD AUTO: 0.03 K/UL
BASOPHILS NFR BLD AUTO: 0.9 %
BILIRUB SERPL-MCNC: 0.3 MG/DL (ref 0.1–1)
BUN SERPL-MCNC: 17 MG/DL (ref 6–20)
CALCIUM SERPL-MCNC: 9.6 MG/DL (ref 8.7–10.5)
CHLORIDE SERPL-SCNC: 108 MMOL/L (ref 95–110)
CHOLEST SERPL-MCNC: 179 MG/DL (ref 120–199)
CHOLEST/HDLC SERPL: 3.5 {RATIO} (ref 2–5)
CO2 SERPL-SCNC: 21 MMOL/L (ref 23–29)
CORTIS SERPL-MCNC: 7.7 UG/DL (ref 4.3–22.4)
CREAT SERPL-MCNC: 0.7 MG/DL (ref 0.5–1.4)
D DIMER PPP IA.FEU-MCNC: <0.19 MG/L FEU
EAG (OHS): 94 MG/DL (ref 68–131)
ERYTHROCYTE [DISTWIDTH] IN BLOOD BY AUTOMATED COUNT: 16.4 % (ref 11.5–14.5)
FERRITIN SERPL-MCNC: 7 NG/ML (ref 20–300)
GFR SERPLBLD CREATININE-BSD FMLA CKD-EPI: >60 ML/MIN/1.73/M2
GLUCOSE SERPL-MCNC: 103 MG/DL (ref 70–110)
HBA1C MFR BLD: 4.9 % (ref 4–5.6)
HCT VFR BLD AUTO: 34.1 % (ref 37–48.5)
HDLC SERPL-MCNC: 51 MG/DL (ref 40–75)
HDLC SERPL: 28.5 % (ref 20–50)
HGB BLD-MCNC: 10.3 GM/DL (ref 12–16)
HOLD SPECIMEN: NORMAL
IMM GRANULOCYTES # BLD AUTO: 0.01 K/UL (ref 0–0.04)
IMM GRANULOCYTES NFR BLD AUTO: 0.3 % (ref 0–0.5)
IRON SATN MFR SERPL: 6 % (ref 20–50)
IRON SERPL-MCNC: 29 UG/DL (ref 30–160)
LDLC SERPL CALC-MCNC: 112.8 MG/DL (ref 63–159)
LYMPHOCYTES # BLD AUTO: 0.85 K/UL (ref 1–4.8)
MCH RBC QN AUTO: 24.6 PG (ref 27–31)
MCHC RBC AUTO-ENTMCNC: 30.2 G/DL (ref 32–36)
MCV RBC AUTO: 82 FL (ref 82–98)
NONHDLC SERPL-MCNC: 128 MG/DL
NUCLEATED RBC (/100WBC) (OHS): 0 /100 WBC
PLATELET # BLD AUTO: 260 K/UL (ref 150–450)
PMV BLD AUTO: 11.7 FL (ref 9.2–12.9)
POTASSIUM SERPL-SCNC: 4.1 MMOL/L (ref 3.5–5.1)
PROT SERPL-MCNC: 7 GM/DL (ref 6–8.4)
RBC # BLD AUTO: 4.18 M/UL (ref 4–5.4)
RELATIVE EOSINOPHIL (OHS): 3.2 %
RELATIVE LYMPHOCYTE (OHS): 24.6 % (ref 18–48)
RELATIVE MONOCYTE (OHS): 10.1 % (ref 4–15)
RELATIVE NEUTROPHIL (OHS): 60.9 % (ref 38–73)
SODIUM SERPL-SCNC: 137 MMOL/L (ref 136–145)
TIBC SERPL-MCNC: 505 UG/DL (ref 250–450)
TRANSFERRIN SERPL-MCNC: 341 MG/DL (ref 200–375)
TRIGL SERPL-MCNC: 76 MG/DL (ref 30–150)
TSH SERPL-ACNC: 1.69 UIU/ML (ref 0.4–4)
WBC # BLD AUTO: 3.45 K/UL (ref 3.9–12.7)

## 2025-05-22 PROCEDURE — 84443 ASSAY THYROID STIM HORMONE: CPT

## 2025-05-22 PROCEDURE — 82533 TOTAL CORTISOL: CPT

## 2025-05-22 PROCEDURE — 80061 LIPID PANEL: CPT

## 2025-05-22 PROCEDURE — 85379 FIBRIN DEGRADATION QUANT: CPT

## 2025-05-22 PROCEDURE — 82306 VITAMIN D 25 HYDROXY: CPT

## 2025-05-22 PROCEDURE — 83036 HEMOGLOBIN GLYCOSYLATED A1C: CPT

## 2025-05-22 PROCEDURE — 85025 COMPLETE CBC W/AUTO DIFF WBC: CPT

## 2025-05-22 PROCEDURE — 84466 ASSAY OF TRANSFERRIN: CPT

## 2025-05-22 PROCEDURE — 36415 COLL VENOUS BLD VENIPUNCTURE: CPT

## 2025-05-22 PROCEDURE — 80053 COMPREHEN METABOLIC PANEL: CPT

## 2025-05-22 PROCEDURE — 82728 ASSAY OF FERRITIN: CPT

## 2025-07-24 ENCOUNTER — PATIENT MESSAGE (OUTPATIENT)
Dept: FAMILY MEDICINE | Facility: CLINIC | Age: 45
End: 2025-07-24
Payer: COMMERCIAL